# Patient Record
Sex: FEMALE | Race: BLACK OR AFRICAN AMERICAN | NOT HISPANIC OR LATINO | URBAN - METROPOLITAN AREA
[De-identification: names, ages, dates, MRNs, and addresses within clinical notes are randomized per-mention and may not be internally consistent; named-entity substitution may affect disease eponyms.]

---

## 2017-05-22 ENCOUNTER — OUTPATIENT (OUTPATIENT)
Dept: OUTPATIENT SERVICES | Facility: HOSPITAL | Age: 72
LOS: 1 days | Discharge: HOME | End: 2017-05-22

## 2017-05-22 DIAGNOSIS — D50.0 IRON DEFICIENCY ANEMIA SECONDARY TO BLOOD LOSS (CHRONIC): ICD-10-CM

## 2017-05-25 ENCOUNTER — APPOINTMENT (OUTPATIENT)
Dept: OPHTHALMOLOGY | Facility: CLINIC | Age: 72
End: 2017-05-25

## 2017-10-18 ENCOUNTER — APPOINTMENT (OUTPATIENT)
Dept: OPHTHALMOLOGY | Facility: CLINIC | Age: 72
End: 2017-10-18
Payer: MEDICARE

## 2017-10-18 PROCEDURE — 92012 INTRM OPH EXAM EST PATIENT: CPT

## 2017-10-18 PROCEDURE — 92083 EXTENDED VISUAL FIELD XM: CPT

## 2017-10-18 PROCEDURE — 76512 OPH US DX B-SCAN: CPT | Mod: RT

## 2017-10-31 ENCOUNTER — APPOINTMENT (OUTPATIENT)
Dept: SURGERY | Facility: CLINIC | Age: 72
End: 2017-10-31

## 2017-10-31 ENCOUNTER — OUTPATIENT (OUTPATIENT)
Dept: OUTPATIENT SERVICES | Facility: HOSPITAL | Age: 72
LOS: 1 days | End: 2017-10-31
Payer: MEDICARE

## 2017-10-31 DIAGNOSIS — E55.9 VITAMIN D DEFICIENCY, UNSPECIFIED: ICD-10-CM

## 2017-10-31 DIAGNOSIS — I10 ESSENTIAL (PRIMARY) HYPERTENSION: ICD-10-CM

## 2017-10-31 DIAGNOSIS — H40.1112 PRIMARY OPEN-ANGLE GLAUCOMA, RIGHT EYE, MODERATE STAGE: ICD-10-CM

## 2017-10-31 DIAGNOSIS — Z01.818 ENCOUNTER FOR OTHER PREPROCEDURAL EXAMINATION: ICD-10-CM

## 2017-10-31 DIAGNOSIS — E03.9 HYPOTHYROIDISM, UNSPECIFIED: ICD-10-CM

## 2017-10-31 DIAGNOSIS — E11.9 TYPE 2 DIABETES MELLITUS WITHOUT COMPLICATIONS: ICD-10-CM

## 2017-10-31 LAB
ANION GAP SERPL CALC-SCNC: 11 MMOL/L — SIGNIFICANT CHANGE UP (ref 5–17)
BUN SERPL-MCNC: 13 MG/DL — SIGNIFICANT CHANGE UP (ref 7–23)
CALCIUM SERPL-MCNC: 9.4 MG/DL — SIGNIFICANT CHANGE UP (ref 8.4–10.5)
CHLORIDE SERPL-SCNC: 104 MMOL/L — SIGNIFICANT CHANGE UP (ref 96–108)
CO2 SERPL-SCNC: 25 MMOL/L — SIGNIFICANT CHANGE UP (ref 22–31)
CREAT SERPL-MCNC: 0.61 MG/DL — SIGNIFICANT CHANGE UP (ref 0.5–1.3)
GLUCOSE SERPL-MCNC: 73 MG/DL — SIGNIFICANT CHANGE UP (ref 70–99)
POTASSIUM SERPL-MCNC: 4 MMOL/L — SIGNIFICANT CHANGE UP (ref 3.5–5.3)
POTASSIUM SERPL-SCNC: 4 MMOL/L — SIGNIFICANT CHANGE UP (ref 3.5–5.3)
SODIUM SERPL-SCNC: 140 MMOL/L — SIGNIFICANT CHANGE UP (ref 135–145)

## 2017-10-31 PROCEDURE — 93010 ELECTROCARDIOGRAM REPORT: CPT

## 2017-11-07 ENCOUNTER — OUTPATIENT (OUTPATIENT)
Dept: OUTPATIENT SERVICES | Facility: HOSPITAL | Age: 72
LOS: 1 days | Discharge: HOME | End: 2017-11-07

## 2017-11-07 ENCOUNTER — APPOINTMENT (OUTPATIENT)
Dept: OPHTHALMOLOGY | Facility: AMBULATORY SURGERY CENTER | Age: 72
End: 2017-11-07
Payer: MEDICARE

## 2017-11-07 PROCEDURE — 65779 COVER EYE W/MEMBRANE SUTURE: CPT | Mod: RT

## 2017-11-07 PROCEDURE — 66250 FOLLOW-UP SURGERY OF EYE: CPT | Mod: RT

## 2017-11-08 ENCOUNTER — APPOINTMENT (OUTPATIENT)
Dept: OPHTHALMOLOGY | Facility: CLINIC | Age: 72
End: 2017-11-08
Payer: MEDICARE

## 2017-11-08 PROCEDURE — 99024 POSTOP FOLLOW-UP VISIT: CPT

## 2017-11-17 ENCOUNTER — APPOINTMENT (OUTPATIENT)
Dept: OPHTHALMOLOGY | Facility: CLINIC | Age: 72
End: 2017-11-17
Payer: MEDICARE

## 2017-11-17 PROCEDURE — 99024 POSTOP FOLLOW-UP VISIT: CPT

## 2017-12-01 ENCOUNTER — APPOINTMENT (OUTPATIENT)
Dept: OPHTHALMOLOGY | Facility: CLINIC | Age: 72
End: 2017-12-01
Payer: MEDICARE

## 2017-12-01 PROCEDURE — 99024 POSTOP FOLLOW-UP VISIT: CPT

## 2018-01-12 ENCOUNTER — APPOINTMENT (OUTPATIENT)
Dept: OPHTHALMOLOGY | Facility: CLINIC | Age: 73
End: 2018-01-12
Payer: MEDICARE

## 2018-01-12 DIAGNOSIS — H40.1112 PRIMARY OPEN-ANGLE GLAUCOMA, RIGHT EYE, MODERATE STAGE: ICD-10-CM

## 2018-01-12 PROCEDURE — 99024 POSTOP FOLLOW-UP VISIT: CPT

## 2018-04-11 ENCOUNTER — APPOINTMENT (OUTPATIENT)
Dept: OPHTHALMOLOGY | Facility: CLINIC | Age: 73
End: 2018-04-11
Payer: MEDICARE

## 2018-04-11 DIAGNOSIS — H25.11 AGE-RELATED NUCLEAR CATARACT, RIGHT EYE: ICD-10-CM

## 2018-04-11 PROCEDURE — 92012 INTRM OPH EXAM EST PATIENT: CPT

## 2018-06-20 RX ORDER — PREDNISOLONE ACETATE 10 MG/ML
1 SUSPENSION/ DROPS OPHTHALMIC
Qty: 1 | Refills: 0 | Status: ACTIVE | COMMUNITY
Start: 2017-11-02 | End: 1900-01-01

## 2018-06-25 ENCOUNTER — APPOINTMENT (OUTPATIENT)
Dept: OPHTHALMOLOGY | Facility: CLINIC | Age: 73
End: 2018-06-25
Payer: MEDICARE

## 2018-06-25 DIAGNOSIS — H26.492 OTHER SECONDARY CATARACT, LEFT EYE: ICD-10-CM

## 2018-06-25 PROCEDURE — 66821 AFTER CATARACT LASER SURGERY: CPT | Mod: LT

## 2018-09-26 ENCOUNTER — APPOINTMENT (OUTPATIENT)
Dept: OPHTHALMOLOGY | Facility: CLINIC | Age: 73
End: 2018-09-26
Payer: MEDICARE

## 2018-09-26 DIAGNOSIS — H35.372 PUCKERING OF MACULA, LEFT EYE: ICD-10-CM

## 2018-09-26 PROCEDURE — 92083 EXTENDED VISUAL FIELD XM: CPT

## 2018-09-26 PROCEDURE — 92020 GONIOSCOPY: CPT

## 2018-09-26 PROCEDURE — 92250 FUNDUS PHOTOGRAPHY W/I&R: CPT

## 2018-09-26 PROCEDURE — 92226: CPT | Mod: LT

## 2018-09-26 PROCEDURE — 92014 COMPRE OPH EXAM EST PT 1/>: CPT

## 2018-10-10 ENCOUNTER — TRANSCRIPTION ENCOUNTER (OUTPATIENT)
Age: 73
End: 2018-10-10

## 2019-03-11 ENCOUNTER — APPOINTMENT (OUTPATIENT)
Dept: OPHTHALMOLOGY | Facility: CLINIC | Age: 74
End: 2019-03-11
Payer: MEDICARE

## 2019-03-11 DIAGNOSIS — H35.3130 NONEXUDATIVE AGE-RELATED MACULAR DEGENERATION, BILATERAL, STAGE UNSPECIFIED: ICD-10-CM

## 2019-03-11 DIAGNOSIS — H40.1123 PRIMARY OPEN-ANGLE GLAUCOMA, LEFT EYE, SEVERE STAGE: ICD-10-CM

## 2019-03-11 PROCEDURE — 92133 CPTRZD OPH DX IMG PST SGM ON: CPT

## 2019-03-11 PROCEDURE — 92014 COMPRE OPH EXAM EST PT 1/>: CPT

## 2019-03-11 PROCEDURE — 92083 EXTENDED VISUAL FIELD XM: CPT

## 2019-08-01 ENCOUNTER — APPOINTMENT (OUTPATIENT)
Dept: OPHTHALMOLOGY | Facility: CLINIC | Age: 74
End: 2019-08-01
Payer: MEDICARE

## 2019-08-01 ENCOUNTER — NON-APPOINTMENT (OUTPATIENT)
Age: 74
End: 2019-08-01

## 2019-08-01 PROCEDURE — 92250 FUNDUS PHOTOGRAPHY W/I&R: CPT

## 2019-08-01 PROCEDURE — 92014 COMPRE OPH EXAM EST PT 1/>: CPT

## 2019-08-01 PROCEDURE — 92136 OPHTHALMIC BIOMETRY: CPT | Mod: RT

## 2019-08-01 PROCEDURE — 92083 EXTENDED VISUAL FIELD XM: CPT

## 2019-08-01 PROCEDURE — 92020 GONIOSCOPY: CPT

## 2019-09-03 ENCOUNTER — OUTPATIENT (OUTPATIENT)
Dept: OUTPATIENT SERVICES | Facility: HOSPITAL | Age: 74
LOS: 1 days | Discharge: HOME | End: 2019-09-03

## 2019-09-03 ENCOUNTER — APPOINTMENT (OUTPATIENT)
Dept: OPHTHALMOLOGY | Facility: AMBULATORY SURGERY CENTER | Age: 74
End: 2019-09-03
Payer: MEDICARE

## 2019-09-03 PROCEDURE — 66984 XCAPSL CTRC RMVL W/O ECP: CPT | Mod: RT

## 2019-09-04 ENCOUNTER — APPOINTMENT (OUTPATIENT)
Dept: OPHTHALMOLOGY | Facility: CLINIC | Age: 74
End: 2019-09-04
Payer: MEDICARE

## 2019-09-04 ENCOUNTER — NON-APPOINTMENT (OUTPATIENT)
Age: 74
End: 2019-09-04

## 2019-09-04 PROCEDURE — 99024 POSTOP FOLLOW-UP VISIT: CPT

## 2019-09-09 ENCOUNTER — NON-APPOINTMENT (OUTPATIENT)
Age: 74
End: 2019-09-09

## 2019-09-09 ENCOUNTER — APPOINTMENT (OUTPATIENT)
Dept: OPHTHALMOLOGY | Facility: CLINIC | Age: 74
End: 2019-09-09
Payer: MEDICARE

## 2019-09-09 PROCEDURE — 99024 POSTOP FOLLOW-UP VISIT: CPT

## 2019-09-23 ENCOUNTER — APPOINTMENT (OUTPATIENT)
Dept: OPHTHALMOLOGY | Facility: CLINIC | Age: 74
End: 2019-09-23
Payer: MEDICARE

## 2019-09-23 ENCOUNTER — NON-APPOINTMENT (OUTPATIENT)
Age: 74
End: 2019-09-23

## 2019-09-23 PROCEDURE — 99024 POSTOP FOLLOW-UP VISIT: CPT

## 2019-11-04 ENCOUNTER — APPOINTMENT (OUTPATIENT)
Dept: OPHTHALMOLOGY | Facility: CLINIC | Age: 74
End: 2019-11-04
Payer: MEDICARE

## 2019-11-04 ENCOUNTER — NON-APPOINTMENT (OUTPATIENT)
Age: 74
End: 2019-11-04

## 2019-11-04 PROCEDURE — 92012 INTRM OPH EXAM EST PATIENT: CPT | Mod: 24

## 2019-11-04 PROCEDURE — 92134 CPTRZ OPH DX IMG PST SGM RTA: CPT

## 2019-12-02 ENCOUNTER — NON-APPOINTMENT (OUTPATIENT)
Age: 74
End: 2019-12-02

## 2019-12-02 ENCOUNTER — APPOINTMENT (OUTPATIENT)
Dept: OPHTHALMOLOGY | Facility: CLINIC | Age: 74
End: 2019-12-02
Payer: MEDICARE

## 2019-12-02 PROCEDURE — 92134 CPTRZ OPH DX IMG PST SGM RTA: CPT

## 2019-12-02 PROCEDURE — 92012 INTRM OPH EXAM EST PATIENT: CPT | Mod: 24

## 2020-03-26 ENCOUNTER — APPOINTMENT (OUTPATIENT)
Age: 75
End: 2020-03-26

## 2020-06-26 ENCOUNTER — APPOINTMENT (OUTPATIENT)
Dept: OPHTHALMOLOGY | Facility: CLINIC | Age: 75
End: 2020-06-26

## 2020-07-02 ENCOUNTER — APPOINTMENT (OUTPATIENT)
Dept: OPHTHALMOLOGY | Facility: CLINIC | Age: 75
End: 2020-07-02

## 2020-07-10 ENCOUNTER — NON-APPOINTMENT (OUTPATIENT)
Age: 75
End: 2020-07-10

## 2020-07-10 ENCOUNTER — APPOINTMENT (OUTPATIENT)
Dept: OPHTHALMOLOGY | Facility: CLINIC | Age: 75
End: 2020-07-10
Payer: MEDICARE

## 2020-07-10 PROCEDURE — 99441: CPT | Mod: 95

## 2020-08-10 ENCOUNTER — APPOINTMENT (OUTPATIENT)
Age: 75
End: 2020-08-10
Payer: MEDICARE

## 2020-08-10 ENCOUNTER — NON-APPOINTMENT (OUTPATIENT)
Age: 75
End: 2020-08-10

## 2020-08-10 PROCEDURE — 92083 EXTENDED VISUAL FIELD XM: CPT

## 2020-08-10 PROCEDURE — 92014 COMPRE OPH EXAM EST PT 1/>: CPT

## 2020-08-10 PROCEDURE — 92250 FUNDUS PHOTOGRAPHY W/I&R: CPT

## 2020-08-25 ENCOUNTER — NON-APPOINTMENT (OUTPATIENT)
Age: 75
End: 2020-08-25

## 2020-08-25 ENCOUNTER — APPOINTMENT (OUTPATIENT)
Dept: OPHTHALMOLOGY | Facility: CLINIC | Age: 75
End: 2020-08-25
Payer: MEDICARE

## 2020-08-25 PROCEDURE — 99212 OFFICE O/P EST SF 10 MIN: CPT

## 2020-09-01 ENCOUNTER — APPOINTMENT (OUTPATIENT)
Dept: OPHTHALMOLOGY | Facility: CLINIC | Age: 75
End: 2020-09-01
Payer: MEDICARE

## 2020-09-01 ENCOUNTER — NON-APPOINTMENT (OUTPATIENT)
Age: 75
End: 2020-09-01

## 2020-09-01 PROCEDURE — 92012 INTRM OPH EXAM EST PATIENT: CPT

## 2020-12-14 ENCOUNTER — NON-APPOINTMENT (OUTPATIENT)
Age: 75
End: 2020-12-14

## 2020-12-14 ENCOUNTER — APPOINTMENT (OUTPATIENT)
Age: 75
End: 2020-12-14
Payer: MEDICARE

## 2020-12-14 PROCEDURE — 92133 CPTRZD OPH DX IMG PST SGM ON: CPT

## 2020-12-14 PROCEDURE — 92012 INTRM OPH EXAM EST PATIENT: CPT

## 2020-12-14 PROCEDURE — 92083 EXTENDED VISUAL FIELD XM: CPT

## 2021-01-28 ENCOUNTER — NON-APPOINTMENT (OUTPATIENT)
Age: 76
End: 2021-01-28

## 2021-02-03 ENCOUNTER — NON-APPOINTMENT (OUTPATIENT)
Age: 76
End: 2021-02-03

## 2021-02-03 ENCOUNTER — APPOINTMENT (OUTPATIENT)
Dept: OPHTHALMOLOGY | Facility: CLINIC | Age: 76
End: 2021-02-03
Payer: MEDICARE

## 2021-02-03 PROCEDURE — 92012 INTRM OPH EXAM EST PATIENT: CPT

## 2021-02-28 ENCOUNTER — TRANSCRIPTION ENCOUNTER (OUTPATIENT)
Age: 76
End: 2021-02-28

## 2021-03-01 ENCOUNTER — RESULT REVIEW (OUTPATIENT)
Age: 76
End: 2021-03-01

## 2021-03-01 ENCOUNTER — NON-APPOINTMENT (OUTPATIENT)
Age: 76
End: 2021-03-01

## 2021-03-01 ENCOUNTER — APPOINTMENT (OUTPATIENT)
Dept: OPHTHALMOLOGY | Facility: AMBULATORY SURGERY CENTER | Age: 76
End: 2021-03-01

## 2021-03-01 ENCOUNTER — OUTPATIENT (OUTPATIENT)
Dept: OUTPATIENT SERVICES | Facility: HOSPITAL | Age: 76
LOS: 1 days | Discharge: HOME | End: 2021-03-01
Payer: MEDICARE

## 2021-03-01 LAB
GRAM STN FLD: SIGNIFICANT CHANGE UP
SPECIMEN SOURCE: SIGNIFICANT CHANGE UP

## 2021-03-01 PROCEDURE — 88304 TISSUE EXAM BY PATHOLOGIST: CPT | Mod: 26

## 2021-03-01 PROCEDURE — 65756 CORNEAL TRNSPL ENDOTHELIAL: CPT | Mod: LT

## 2021-03-02 ENCOUNTER — APPOINTMENT (OUTPATIENT)
Dept: OPHTHALMOLOGY | Facility: CLINIC | Age: 76
End: 2021-03-02
Payer: MEDICARE

## 2021-03-02 ENCOUNTER — NON-APPOINTMENT (OUTPATIENT)
Age: 76
End: 2021-03-02

## 2021-03-02 PROCEDURE — 99024 POSTOP FOLLOW-UP VISIT: CPT

## 2021-03-04 LAB
CULTURE RESULTS: NO GROWTH — SIGNIFICANT CHANGE UP
SPECIMEN SOURCE: SIGNIFICANT CHANGE UP
SURGICAL PATHOLOGY STUDY: SIGNIFICANT CHANGE UP

## 2021-03-09 ENCOUNTER — APPOINTMENT (OUTPATIENT)
Dept: OPHTHALMOLOGY | Facility: CLINIC | Age: 76
End: 2021-03-09
Payer: MEDICARE

## 2021-03-09 ENCOUNTER — NON-APPOINTMENT (OUTPATIENT)
Age: 76
End: 2021-03-09

## 2021-03-09 PROCEDURE — 99024 POSTOP FOLLOW-UP VISIT: CPT

## 2021-03-30 ENCOUNTER — APPOINTMENT (OUTPATIENT)
Dept: OPHTHALMOLOGY | Facility: CLINIC | Age: 76
End: 2021-03-30
Payer: MEDICARE

## 2021-03-30 ENCOUNTER — NON-APPOINTMENT (OUTPATIENT)
Age: 76
End: 2021-03-30

## 2021-03-30 PROCEDURE — 99024 POSTOP FOLLOW-UP VISIT: CPT

## 2021-04-12 ENCOUNTER — APPOINTMENT (OUTPATIENT)
Dept: OPHTHALMOLOGY | Facility: CLINIC | Age: 76
End: 2021-04-12
Payer: MEDICARE

## 2021-04-12 ENCOUNTER — NON-APPOINTMENT (OUTPATIENT)
Age: 76
End: 2021-04-12

## 2021-04-12 PROCEDURE — 92012 INTRM OPH EXAM EST PATIENT: CPT | Mod: 24

## 2021-04-12 PROCEDURE — 92083 EXTENDED VISUAL FIELD XM: CPT

## 2021-04-28 ENCOUNTER — APPOINTMENT (OUTPATIENT)
Dept: OPHTHALMOLOGY | Facility: CLINIC | Age: 76
End: 2021-04-28
Payer: MEDICARE

## 2021-04-28 ENCOUNTER — NON-APPOINTMENT (OUTPATIENT)
Age: 76
End: 2021-04-28

## 2021-04-28 PROCEDURE — 99024 POSTOP FOLLOW-UP VISIT: CPT

## 2021-04-28 PROCEDURE — 92020 GONIOSCOPY: CPT

## 2021-06-09 ENCOUNTER — APPOINTMENT (OUTPATIENT)
Dept: OPHTHALMOLOGY | Facility: CLINIC | Age: 76
End: 2021-06-09
Payer: MEDICARE

## 2021-06-09 ENCOUNTER — NON-APPOINTMENT (OUTPATIENT)
Age: 76
End: 2021-06-09

## 2021-06-09 PROCEDURE — 92012 INTRM OPH EXAM EST PATIENT: CPT

## 2021-08-11 ENCOUNTER — NON-APPOINTMENT (OUTPATIENT)
Age: 76
End: 2021-08-11

## 2021-08-11 ENCOUNTER — APPOINTMENT (OUTPATIENT)
Dept: OPHTHALMOLOGY | Facility: CLINIC | Age: 76
End: 2021-08-11
Payer: MEDICARE

## 2021-08-11 PROCEDURE — 92012 INTRM OPH EXAM EST PATIENT: CPT

## 2021-08-12 ENCOUNTER — NON-APPOINTMENT (OUTPATIENT)
Age: 76
End: 2021-08-12

## 2021-08-12 ENCOUNTER — APPOINTMENT (OUTPATIENT)
Dept: OPHTHALMOLOGY | Facility: CLINIC | Age: 76
End: 2021-08-12
Payer: MEDICARE

## 2021-08-12 PROCEDURE — 92134 CPTRZ OPH DX IMG PST SGM RTA: CPT

## 2021-08-12 PROCEDURE — 92083 EXTENDED VISUAL FIELD XM: CPT

## 2021-08-12 PROCEDURE — 92012 INTRM OPH EXAM EST PATIENT: CPT

## 2021-11-10 ENCOUNTER — APPOINTMENT (OUTPATIENT)
Dept: OPHTHALMOLOGY | Facility: CLINIC | Age: 76
End: 2021-11-10
Payer: MEDICARE

## 2021-11-10 ENCOUNTER — NON-APPOINTMENT (OUTPATIENT)
Age: 76
End: 2021-11-10

## 2021-11-10 PROCEDURE — 92012 INTRM OPH EXAM EST PATIENT: CPT

## 2021-11-18 ENCOUNTER — NON-APPOINTMENT (OUTPATIENT)
Age: 76
End: 2021-11-18

## 2021-11-18 ENCOUNTER — APPOINTMENT (OUTPATIENT)
Dept: OPHTHALMOLOGY | Facility: CLINIC | Age: 76
End: 2021-11-18
Payer: MEDICARE

## 2021-11-18 PROCEDURE — 92083 EXTENDED VISUAL FIELD XM: CPT

## 2021-11-18 PROCEDURE — 92250 FUNDUS PHOTOGRAPHY W/I&R: CPT

## 2021-11-18 PROCEDURE — 92014 COMPRE OPH EXAM EST PT 1/>: CPT

## 2021-12-02 ENCOUNTER — APPOINTMENT (OUTPATIENT)
Dept: OTOLARYNGOLOGY | Facility: CLINIC | Age: 76
End: 2021-12-02
Payer: MEDICARE

## 2021-12-02 DIAGNOSIS — H90.5 UNSPECIFIED SENSORINEURAL HEARING LOSS: ICD-10-CM

## 2021-12-02 DIAGNOSIS — Z78.9 OTHER SPECIFIED HEALTH STATUS: ICD-10-CM

## 2021-12-02 DIAGNOSIS — H93.19 TINNITUS, UNSPECIFIED EAR: ICD-10-CM

## 2021-12-02 PROCEDURE — 99203 OFFICE O/P NEW LOW 30 MIN: CPT

## 2021-12-02 PROCEDURE — 92567 TYMPANOMETRY: CPT

## 2021-12-02 PROCEDURE — 92557 COMPREHENSIVE HEARING TEST: CPT

## 2021-12-02 RX ORDER — FEXOFENADINE HCL 60 MG
CAPSULE ORAL
Refills: 0 | Status: ACTIVE | COMMUNITY

## 2021-12-02 RX ORDER — OFLOXACIN 3 MG/ML
0.3 SOLUTION/ DROPS OPHTHALMIC
Qty: 1 | Refills: 2 | Status: COMPLETED | COMMUNITY
Start: 2017-11-02 | End: 2021-12-02

## 2021-12-02 RX ORDER — CHROMIUM 200 MCG
TABLET ORAL
Refills: 0 | Status: ACTIVE | COMMUNITY

## 2021-12-02 RX ORDER — KETOROLAC TROMETHAMINE 4 MG/ML
0.4 SOLUTION/ DROPS OPHTHALMIC
Qty: 1 | Refills: 11 | Status: COMPLETED | COMMUNITY
Start: 2017-11-02 | End: 2021-12-02

## 2021-12-02 NOTE — HISTORY OF PRESENT ILLNESS
[de-identified] : 76 year old female bilateral hearing loss. Reports gradual bilateral hearing loss started last year. States occasional bilateral clogged ears and hearing is worse. States does not have hearing aids. Reports hears better out of right ear. Denies family history of hearing loss. Reports intermittent bilateral tinnitus. Denies otalgia, otorrhea, ear infections, dizziness, vertigo, ear surgeries. Denies history of head/otologic trauma. Denies CT scan or MRI. Denies loud noise exposure. Feels has been going on for many years, but worse over last year.  [Hearing Loss] : hearing loss [Ear Fullness] : no ear fullness [Tinnitus] : tinnitus [None] : No relevant exposures have been identified.

## 2022-01-26 ENCOUNTER — APPOINTMENT (OUTPATIENT)
Dept: PHARMACY | Facility: CLINIC | Age: 77
End: 2022-01-26

## 2022-02-04 ENCOUNTER — APPOINTMENT (OUTPATIENT)
Dept: PHARMACY | Facility: CLINIC | Age: 77
End: 2022-02-04
Payer: SELF-PAY

## 2022-02-04 PROCEDURE — V5010 ASSESSMENT FOR HEARING AID: CPT

## 2022-02-09 ENCOUNTER — APPOINTMENT (OUTPATIENT)
Dept: OPHTHALMOLOGY | Facility: CLINIC | Age: 77
End: 2022-02-09
Payer: MEDICARE

## 2022-02-09 ENCOUNTER — NON-APPOINTMENT (OUTPATIENT)
Age: 77
End: 2022-02-09

## 2022-02-09 PROCEDURE — 92012 INTRM OPH EXAM EST PATIENT: CPT

## 2022-03-07 ENCOUNTER — APPOINTMENT (OUTPATIENT)
Dept: PHARMACY | Facility: CLINIC | Age: 77
End: 2022-03-07
Payer: SELF-PAY

## 2022-03-07 PROCEDURE — V5261J: CUSTOM

## 2022-03-10 ENCOUNTER — APPOINTMENT (OUTPATIENT)
Dept: OPHTHALMOLOGY | Facility: CLINIC | Age: 77
End: 2022-03-10
Payer: MEDICARE

## 2022-03-10 ENCOUNTER — NON-APPOINTMENT (OUTPATIENT)
Age: 77
End: 2022-03-10

## 2022-03-10 PROCEDURE — 92012 INTRM OPH EXAM EST PATIENT: CPT

## 2022-03-10 PROCEDURE — 92133 CPTRZD OPH DX IMG PST SGM ON: CPT

## 2022-04-11 ENCOUNTER — APPOINTMENT (OUTPATIENT)
Dept: PHARMACY | Facility: CLINIC | Age: 77
End: 2022-04-11
Payer: SELF-PAY

## 2022-04-11 PROCEDURE — V5299A: CUSTOM | Mod: NC

## 2022-06-10 ENCOUNTER — APPOINTMENT (OUTPATIENT)
Dept: OPHTHALMOLOGY | Facility: CLINIC | Age: 77
End: 2022-06-10
Payer: MEDICARE

## 2022-06-10 ENCOUNTER — NON-APPOINTMENT (OUTPATIENT)
Age: 77
End: 2022-06-10

## 2022-06-10 PROCEDURE — 92134 CPTRZ OPH DX IMG PST SGM RTA: CPT

## 2022-06-10 PROCEDURE — 92083 EXTENDED VISUAL FIELD XM: CPT

## 2022-06-10 PROCEDURE — 92012 INTRM OPH EXAM EST PATIENT: CPT

## 2022-08-01 ENCOUNTER — APPOINTMENT (OUTPATIENT)
Dept: PHARMACY | Facility: CLINIC | Age: 77
End: 2022-08-01

## 2022-08-01 PROCEDURE — V5299A: CUSTOM | Mod: NC

## 2022-08-03 ENCOUNTER — NON-APPOINTMENT (OUTPATIENT)
Age: 77
End: 2022-08-03

## 2022-08-03 ENCOUNTER — APPOINTMENT (OUTPATIENT)
Dept: OPHTHALMOLOGY | Facility: CLINIC | Age: 77
End: 2022-08-03

## 2022-08-03 PROCEDURE — 92012 INTRM OPH EXAM EST PATIENT: CPT

## 2022-10-07 ENCOUNTER — APPOINTMENT (OUTPATIENT)
Dept: OPHTHALMOLOGY | Facility: CLINIC | Age: 77
End: 2022-10-07

## 2022-10-07 ENCOUNTER — NON-APPOINTMENT (OUTPATIENT)
Age: 77
End: 2022-10-07

## 2022-10-07 PROCEDURE — 92250 FUNDUS PHOTOGRAPHY W/I&R: CPT

## 2022-10-07 PROCEDURE — 92014 COMPRE OPH EXAM EST PT 1/>: CPT

## 2022-10-07 PROCEDURE — 92083 EXTENDED VISUAL FIELD XM: CPT

## 2022-11-02 ENCOUNTER — NON-APPOINTMENT (OUTPATIENT)
Age: 77
End: 2022-11-02

## 2022-11-02 ENCOUNTER — APPOINTMENT (OUTPATIENT)
Dept: OPHTHALMOLOGY | Facility: CLINIC | Age: 77
End: 2022-11-02

## 2022-11-02 PROCEDURE — 92012 INTRM OPH EXAM EST PATIENT: CPT

## 2022-11-02 PROCEDURE — 92286 ANT SGM IMG I&R SPECLR MIC: CPT

## 2022-11-18 ENCOUNTER — OUTPATIENT (OUTPATIENT)
Dept: OUTPATIENT SERVICES | Facility: HOSPITAL | Age: 77
LOS: 1 days | Discharge: HOME | End: 2022-11-18
Payer: MEDICARE

## 2022-11-18 ENCOUNTER — RESULT REVIEW (OUTPATIENT)
Age: 77
End: 2022-11-18

## 2022-11-18 VITALS
HEART RATE: 79 BPM | DIASTOLIC BLOOD PRESSURE: 63 MMHG | TEMPERATURE: 97 F | WEIGHT: 231.04 LBS | SYSTOLIC BLOOD PRESSURE: 130 MMHG | RESPIRATION RATE: 22 BRPM | OXYGEN SATURATION: 98 % | HEIGHT: 64 IN

## 2022-11-18 DIAGNOSIS — K21.9 GASTRO-ESOPHAGEAL REFLUX DISEASE WITHOUT ESOPHAGITIS: ICD-10-CM

## 2022-11-18 DIAGNOSIS — Z90.49 ACQUIRED ABSENCE OF OTHER SPECIFIED PARTS OF DIGESTIVE TRACT: Chronic | ICD-10-CM

## 2022-11-18 DIAGNOSIS — E66.01 MORBID (SEVERE) OBESITY DUE TO EXCESS CALORIES: ICD-10-CM

## 2022-11-18 PROCEDURE — 88312 SPECIAL STAINS GROUP 1: CPT

## 2022-11-18 PROCEDURE — 88305 TISSUE EXAM BY PATHOLOGIST: CPT | Mod: 26

## 2022-11-18 PROCEDURE — 88312 SPECIAL STAINS GROUP 1: CPT | Mod: 26

## 2022-11-18 PROCEDURE — 88305 TISSUE EXAM BY PATHOLOGIST: CPT

## 2022-11-18 PROCEDURE — 88313 SPECIAL STAINS GROUP 2: CPT | Mod: 26

## 2022-11-18 PROCEDURE — 88313 SPECIAL STAINS GROUP 2: CPT

## 2022-11-18 RX ORDER — LEVOTHYROXINE SODIUM 125 MCG
0 TABLET ORAL
Qty: 0 | Refills: 0 | DISCHARGE

## 2022-11-18 RX ORDER — FEXOFENADINE HCL 30 MG
0 TABLET ORAL
Qty: 0 | Refills: 0 | DISCHARGE

## 2022-11-18 NOTE — ASU PATIENT PROFILE, ADULT - NSICDXPASTMEDICALHX_GEN_ALL_CORE_FT
PAST MEDICAL HISTORY:  GERD (gastroesophageal reflux disease)     HLD (hyperlipidemia)     Hypothyroid

## 2022-11-18 NOTE — ASU PATIENT PROFILE, ADULT - FALL HARM RISK - HARM RISK INTERVENTIONS

## 2022-11-24 DIAGNOSIS — R13.10 DYSPHAGIA, UNSPECIFIED: ICD-10-CM

## 2022-11-24 DIAGNOSIS — E55.9 VITAMIN D DEFICIENCY, UNSPECIFIED: ICD-10-CM

## 2022-11-24 DIAGNOSIS — K29.50 UNSPECIFIED CHRONIC GASTRITIS WITHOUT BLEEDING: ICD-10-CM

## 2022-11-24 DIAGNOSIS — Z90.49 ACQUIRED ABSENCE OF OTHER SPECIFIED PARTS OF DIGESTIVE TRACT: ICD-10-CM

## 2022-11-24 DIAGNOSIS — K20.90 ESOPHAGITIS, UNSPECIFIED WITHOUT BLEEDING: ICD-10-CM

## 2022-11-24 DIAGNOSIS — E03.9 HYPOTHYROIDISM, UNSPECIFIED: ICD-10-CM

## 2022-11-24 DIAGNOSIS — M19.90 UNSPECIFIED OSTEOARTHRITIS, UNSPECIFIED SITE: ICD-10-CM

## 2022-11-24 DIAGNOSIS — E78.5 HYPERLIPIDEMIA, UNSPECIFIED: ICD-10-CM

## 2022-11-24 DIAGNOSIS — E66.9 OBESITY, UNSPECIFIED: ICD-10-CM

## 2022-11-24 DIAGNOSIS — Z98.84 BARIATRIC SURGERY STATUS: ICD-10-CM

## 2023-01-24 PROBLEM — E78.5 HYPERLIPIDEMIA, UNSPECIFIED: Chronic | Status: ACTIVE | Noted: 2022-11-18

## 2023-01-24 PROBLEM — K21.9 GASTRO-ESOPHAGEAL REFLUX DISEASE WITHOUT ESOPHAGITIS: Chronic | Status: ACTIVE | Noted: 2022-11-18

## 2023-01-24 PROBLEM — E03.9 HYPOTHYROIDISM, UNSPECIFIED: Chronic | Status: ACTIVE | Noted: 2022-11-18

## 2023-02-10 ENCOUNTER — NON-APPOINTMENT (OUTPATIENT)
Age: 78
End: 2023-02-10

## 2023-02-10 ENCOUNTER — APPOINTMENT (OUTPATIENT)
Dept: OPHTHALMOLOGY | Facility: CLINIC | Age: 78
End: 2023-02-10
Payer: MEDICARE

## 2023-02-10 PROCEDURE — 92012 INTRM OPH EXAM EST PATIENT: CPT

## 2023-02-10 PROCEDURE — 92134 CPTRZ OPH DX IMG PST SGM RTA: CPT

## 2023-03-15 ENCOUNTER — APPOINTMENT (OUTPATIENT)
Dept: OPHTHALMOLOGY | Facility: CLINIC | Age: 78
End: 2023-03-15
Payer: MEDICARE

## 2023-03-15 ENCOUNTER — NON-APPOINTMENT (OUTPATIENT)
Age: 78
End: 2023-03-15

## 2023-03-15 PROCEDURE — 92012 INTRM OPH EXAM EST PATIENT: CPT

## 2023-06-19 ENCOUNTER — APPOINTMENT (OUTPATIENT)
Dept: PHARMACY | Facility: CLINIC | Age: 78
End: 2023-06-19
Payer: SELF-PAY

## 2023-06-19 PROCEDURE — V5267C: CUSTOM

## 2023-06-19 PROCEDURE — V5267D: CUSTOM

## 2023-08-07 ENCOUNTER — NON-APPOINTMENT (OUTPATIENT)
Age: 78
End: 2023-08-07

## 2023-08-07 ENCOUNTER — APPOINTMENT (OUTPATIENT)
Dept: OPHTHALMOLOGY | Facility: CLINIC | Age: 78
End: 2023-08-07
Payer: MEDICARE

## 2023-08-07 PROCEDURE — 92012 INTRM OPH EXAM EST PATIENT: CPT

## 2023-08-07 PROCEDURE — 92083 EXTENDED VISUAL FIELD XM: CPT

## 2023-08-07 PROCEDURE — 92133 CPTRZD OPH DX IMG PST SGM ON: CPT

## 2023-08-17 ENCOUNTER — NON-APPOINTMENT (OUTPATIENT)
Age: 78
End: 2023-08-17

## 2023-08-18 ENCOUNTER — NON-APPOINTMENT (OUTPATIENT)
Age: 78
End: 2023-08-18

## 2023-08-18 ENCOUNTER — APPOINTMENT (OUTPATIENT)
Dept: OPHTHALMOLOGY | Facility: CLINIC | Age: 78
End: 2023-08-18
Payer: MEDICARE

## 2023-08-18 PROCEDURE — 92286 ANT SGM IMG I&R SPECLR MIC: CPT

## 2023-08-18 PROCEDURE — 92012 INTRM OPH EXAM EST PATIENT: CPT

## 2023-08-25 ENCOUNTER — APPOINTMENT (OUTPATIENT)
Dept: OTOLARYNGOLOGY | Facility: CLINIC | Age: 78
End: 2023-08-25
Payer: MEDICARE

## 2023-08-25 VITALS
DIASTOLIC BLOOD PRESSURE: 86 MMHG | WEIGHT: 219 LBS | SYSTOLIC BLOOD PRESSURE: 137 MMHG | HEART RATE: 65 BPM | BODY MASS INDEX: 38.8 KG/M2 | HEIGHT: 63 IN

## 2023-08-25 PROCEDURE — 92557 COMPREHENSIVE HEARING TEST: CPT

## 2023-08-25 PROCEDURE — 99213 OFFICE O/P EST LOW 20 MIN: CPT

## 2023-08-25 PROCEDURE — 92567 TYMPANOMETRY: CPT

## 2023-08-25 NOTE — HISTORY OF PRESENT ILLNESS
[de-identified] : 77 year old female following up for bilateral hearing loss  Report consistent use of bilateral hearing aids  States hearing has improved since using HAs.  Reports constant tinnitus in left ear, occasional on right side  Reports intermittent clogged sensation on left side.  Improves when she opens and closes her mouth.  Patient denies otalgia, otorrhea, ear infections, dizziness, vertigo, headaches related to hearing.

## 2023-12-17 ENCOUNTER — NON-APPOINTMENT (OUTPATIENT)
Age: 78
End: 2023-12-17

## 2023-12-18 ENCOUNTER — APPOINTMENT (OUTPATIENT)
Dept: OPHTHALMOLOGY | Facility: CLINIC | Age: 78
End: 2023-12-18
Payer: MEDICARE

## 2023-12-18 ENCOUNTER — NON-APPOINTMENT (OUTPATIENT)
Age: 78
End: 2023-12-18

## 2023-12-18 PROCEDURE — 92134 CPTRZ OPH DX IMG PST SGM RTA: CPT

## 2023-12-18 PROCEDURE — 92012 INTRM OPH EXAM EST PATIENT: CPT

## 2024-02-21 ENCOUNTER — APPOINTMENT (OUTPATIENT)
Dept: OPHTHALMOLOGY | Facility: CLINIC | Age: 79
End: 2024-02-21
Payer: MEDICARE

## 2024-02-21 ENCOUNTER — NON-APPOINTMENT (OUTPATIENT)
Age: 79
End: 2024-02-21

## 2024-02-21 PROCEDURE — 92012 INTRM OPH EXAM EST PATIENT: CPT

## 2024-04-22 ENCOUNTER — APPOINTMENT (OUTPATIENT)
Dept: OPHTHALMOLOGY | Facility: CLINIC | Age: 79
End: 2024-04-22
Payer: MEDICARE

## 2024-04-22 ENCOUNTER — NON-APPOINTMENT (OUTPATIENT)
Age: 79
End: 2024-04-22

## 2024-04-22 PROCEDURE — 92083 EXTENDED VISUAL FIELD XM: CPT

## 2024-04-22 PROCEDURE — 92012 INTRM OPH EXAM EST PATIENT: CPT

## 2024-05-29 ENCOUNTER — APPOINTMENT (OUTPATIENT)
Dept: PHARMACY | Facility: CLINIC | Age: 79
End: 2024-05-29
Payer: SELF-PAY

## 2024-05-29 PROCEDURE — V5299A: CUSTOM

## 2024-07-10 ENCOUNTER — NON-APPOINTMENT (OUTPATIENT)
Age: 79
End: 2024-07-10

## 2024-07-10 ENCOUNTER — APPOINTMENT (OUTPATIENT)
Dept: OPHTHALMOLOGY | Facility: CLINIC | Age: 79
End: 2024-07-10
Payer: MEDICARE

## 2024-07-10 PROCEDURE — 92012 INTRM OPH EXAM EST PATIENT: CPT

## 2024-07-10 PROCEDURE — 92286 ANT SGM IMG I&R SPECLR MIC: CPT

## 2024-08-26 ENCOUNTER — NON-APPOINTMENT (OUTPATIENT)
Age: 79
End: 2024-08-26

## 2024-08-26 ENCOUNTER — APPOINTMENT (OUTPATIENT)
Dept: OPHTHALMOLOGY | Facility: CLINIC | Age: 79
End: 2024-08-26
Payer: COMMERCIAL

## 2024-08-26 PROCEDURE — 92012 INTRM OPH EXAM EST PATIENT: CPT

## 2024-08-26 PROCEDURE — 92133 CPTRZD OPH DX IMG PST SGM ON: CPT

## 2024-12-02 ENCOUNTER — NON-APPOINTMENT (OUTPATIENT)
Age: 79
End: 2024-12-02

## 2024-12-02 ENCOUNTER — APPOINTMENT (OUTPATIENT)
Dept: OPHTHALMOLOGY | Facility: CLINIC | Age: 79
End: 2024-12-02
Payer: MEDICARE

## 2024-12-02 PROCEDURE — 92014 COMPRE OPH EXAM EST PT 1/>: CPT

## 2024-12-02 PROCEDURE — 92133 CPTRZD OPH DX IMG PST SGM ON: CPT

## 2024-12-02 PROCEDURE — 92083 EXTENDED VISUAL FIELD XM: CPT

## 2024-12-17 ENCOUNTER — INPATIENT (INPATIENT)
Facility: HOSPITAL | Age: 79
LOS: 2 days | Discharge: HOME | End: 2024-12-20
Attending: HOSPITALIST | Admitting: HOSPITALIST
Payer: MEDICARE

## 2024-12-17 VITALS
HEART RATE: 70 BPM | RESPIRATION RATE: 18 BRPM | WEIGHT: 210.1 LBS | DIASTOLIC BLOOD PRESSURE: 82 MMHG | HEIGHT: 64 IN | SYSTOLIC BLOOD PRESSURE: 151 MMHG | TEMPERATURE: 98 F | OXYGEN SATURATION: 99 %

## 2024-12-17 DIAGNOSIS — Z90.49 ACQUIRED ABSENCE OF OTHER SPECIFIED PARTS OF DIGESTIVE TRACT: Chronic | ICD-10-CM

## 2024-12-17 LAB
ALBUMIN SERPL ELPH-MCNC: 3.9 G/DL — SIGNIFICANT CHANGE UP (ref 3.3–5)
ALP SERPL-CCNC: 134 U/L — HIGH (ref 40–120)
ALT FLD-CCNC: 15 U/L — SIGNIFICANT CHANGE UP (ref 4–33)
ANION GAP SERPL CALC-SCNC: 11 MMOL/L — SIGNIFICANT CHANGE UP (ref 7–14)
APTT BLD: 25.9 SEC — SIGNIFICANT CHANGE UP (ref 24.5–35.6)
AST SERPL-CCNC: 26 U/L — SIGNIFICANT CHANGE UP (ref 4–32)
BASOPHILS # BLD AUTO: 0.02 K/UL — SIGNIFICANT CHANGE UP (ref 0–0.2)
BASOPHILS NFR BLD AUTO: 0.4 % — SIGNIFICANT CHANGE UP (ref 0–2)
BILIRUB SERPL-MCNC: <0.2 MG/DL — SIGNIFICANT CHANGE UP (ref 0.2–1.2)
BUN SERPL-MCNC: 19 MG/DL — SIGNIFICANT CHANGE UP (ref 7–23)
CALCIUM SERPL-MCNC: 9.3 MG/DL — SIGNIFICANT CHANGE UP (ref 8.4–10.5)
CHLORIDE SERPL-SCNC: 106 MMOL/L — SIGNIFICANT CHANGE UP (ref 98–107)
CO2 SERPL-SCNC: 23 MMOL/L — SIGNIFICANT CHANGE UP (ref 22–31)
CREAT SERPL-MCNC: 0.78 MG/DL — SIGNIFICANT CHANGE UP (ref 0.5–1.3)
EGFR: 77 ML/MIN/1.73M2 — SIGNIFICANT CHANGE UP
EOSINOPHIL # BLD AUTO: 0.05 K/UL — SIGNIFICANT CHANGE UP (ref 0–0.5)
EOSINOPHIL NFR BLD AUTO: 1 % — SIGNIFICANT CHANGE UP (ref 0–6)
GLUCOSE SERPL-MCNC: 67 MG/DL — LOW (ref 70–99)
HCT VFR BLD CALC: 36.6 % — SIGNIFICANT CHANGE UP (ref 34.5–45)
HGB BLD-MCNC: 11.7 G/DL — SIGNIFICANT CHANGE UP (ref 11.5–15.5)
IANC: 2.3 K/UL — SIGNIFICANT CHANGE UP (ref 1.8–7.4)
IMM GRANULOCYTES NFR BLD AUTO: 0.2 % — SIGNIFICANT CHANGE UP (ref 0–0.9)
INR BLD: 0.91 RATIO — SIGNIFICANT CHANGE UP (ref 0.85–1.16)
LYMPHOCYTES # BLD AUTO: 2.03 K/UL — SIGNIFICANT CHANGE UP (ref 1–3.3)
LYMPHOCYTES # BLD AUTO: 41.9 % — SIGNIFICANT CHANGE UP (ref 13–44)
MCHC RBC-ENTMCNC: 30.4 PG — SIGNIFICANT CHANGE UP (ref 27–34)
MCHC RBC-ENTMCNC: 32 G/DL — SIGNIFICANT CHANGE UP (ref 32–36)
MCV RBC AUTO: 95.1 FL — SIGNIFICANT CHANGE UP (ref 80–100)
MONOCYTES # BLD AUTO: 0.44 K/UL — SIGNIFICANT CHANGE UP (ref 0–0.9)
MONOCYTES NFR BLD AUTO: 9.1 % — SIGNIFICANT CHANGE UP (ref 2–14)
NEUTROPHILS # BLD AUTO: 2.3 K/UL — SIGNIFICANT CHANGE UP (ref 1.8–7.4)
NEUTROPHILS NFR BLD AUTO: 47.4 % — SIGNIFICANT CHANGE UP (ref 43–77)
NRBC # BLD: 0 /100 WBCS — SIGNIFICANT CHANGE UP (ref 0–0)
NRBC # FLD: 0 K/UL — SIGNIFICANT CHANGE UP (ref 0–0)
PLATELET # BLD AUTO: 259 K/UL — SIGNIFICANT CHANGE UP (ref 150–400)
POTASSIUM SERPL-MCNC: 4.6 MMOL/L — SIGNIFICANT CHANGE UP (ref 3.5–5.3)
POTASSIUM SERPL-SCNC: 4.6 MMOL/L — SIGNIFICANT CHANGE UP (ref 3.5–5.3)
PROT SERPL-MCNC: 7.3 G/DL — SIGNIFICANT CHANGE UP (ref 6–8.3)
PROTHROM AB SERPL-ACNC: 10.6 SEC — SIGNIFICANT CHANGE UP (ref 9.9–13.4)
RBC # BLD: 3.85 M/UL — SIGNIFICANT CHANGE UP (ref 3.8–5.2)
RBC # FLD: 13.3 % — SIGNIFICANT CHANGE UP (ref 10.3–14.5)
SODIUM SERPL-SCNC: 140 MMOL/L — SIGNIFICANT CHANGE UP (ref 135–145)
TROPONIN T, HIGH SENSITIVITY RESULT: 11 NG/L — SIGNIFICANT CHANGE UP
WBC # BLD: 4.85 K/UL — SIGNIFICANT CHANGE UP (ref 3.8–10.5)
WBC # FLD AUTO: 4.85 K/UL — SIGNIFICANT CHANGE UP (ref 3.8–10.5)

## 2024-12-17 PROCEDURE — 70450 CT HEAD/BRAIN W/O DYE: CPT | Mod: 26,MC

## 2024-12-17 PROCEDURE — 99285 EMERGENCY DEPT VISIT HI MDM: CPT

## 2024-12-17 RX ORDER — METHYLPREDNISOLONE SOD SUCC 125 MG
40 VIAL (EA) INJECTION ONCE
Refills: 0 | Status: COMPLETED | OUTPATIENT
Start: 2024-12-17 | End: 2024-12-17

## 2024-12-17 RX ORDER — DIPHENHYDRAMINE HCL 25 MG
50 CAPSULE ORAL ONCE
Refills: 0 | Status: DISCONTINUED | OUTPATIENT
Start: 2024-12-17 | End: 2024-12-18

## 2024-12-17 RX ADMIN — Medication 40 MILLIGRAM(S): at 23:55

## 2024-12-17 NOTE — ED ADULT NURSE NOTE - OBJECTIVE STATEMENT
[place] RN: Pt received at CT scan as code stroke. Pt alert and oriented x 4, ambulatory with cane at baseline. Hx of hypothyroidism (noncompliant with medication). Pt c/o sudden onset of dizziness, headache, and left arm numbness that began 1 hour prior to arrival that has resolved at this time. Pt reports decreased sensation to the arm and left side of face.  POCT blood glucose in triage 64, repeat 66, pt passed dysphagia, provided apple juice. Gait steady. Respirations even and unlabored, NAD. Pt denies chest pain, shortness of breath, N/V/D, fever, chills,  symptoms. 20G IV in the left AC, labs drawn per MD orders. Report given to primary RN.

## 2024-12-17 NOTE — ED ADULT NURSE NOTE - NSFALLRISKINTERV_ED_ALL_ED
Avoid any supplements Assistance OOB with selected safe patient handling equipment if applicable/Assistance with ambulation/Communicate fall risk and risk factors to all staff, patient, and family/Encourage patient to sit up slowly, dangle for a short time, stand at bedside before walking/Monitor gait and stability/Orthostatic vital signs/Provide patient with walking aids/Provide visual cue: yellow wristband, yellow gown, etc/Reinforce activity limits and safety measures with patient and family/Call bell, personal items and telephone in reach/Instruct patient to call for assistance before getting out of bed/chair/stretcher/Non-slip footwear applied when patient is off stretcher/[place] to call system/Physically safe environment - no spills, clutter or unnecessary equipment/Purposeful Proactive Rounding/Room/bathroom lighting operational, light cord in reach

## 2024-12-17 NOTE — ED ADULT TRIAGE NOTE - CHIEF COMPLAINT QUOTE
Pt reports sudden onset dizziness, left arm numbness, headache about an hour ago that has since resolved. Pt with residual decreased sensation to the left side. Denies chest pain, SOB.  Phx hypothyroidism.

## 2024-12-17 NOTE — ED PROVIDER NOTE - ATTENDING CONTRIBUTION TO CARE
80yo F ho htn, hypothyroidism pw cramping and numbness in left arm and neck and headache and dizziness iand unsteadiness intermittently,   face, arm and leg with subjective decreased sensation on left side   slight drift on left arm   symptoms started about 9pm   code stroke called, neuro at bedside, dispo pending labs and CT imaging 80yo F ho htn, hypothyroidism pw cramping and numbness in left arm and neck and headache and dizziness iand unsteadiness intermittently,   face, arm and leg with subjective decreased sensation on left side   slight drift on left arm   symptoms started about 9pm   code stroke called, neuro at bedside, dispo pending labs and CT imaging  pt hypoglycemic in triage, symptoms did no improvement in symptoms  also pt states she has a contrast allergy, dw neuro, will do CT noncon and premedicate for angio

## 2024-12-17 NOTE — ED PROVIDER NOTE - CLINICAL SUMMARY MEDICAL DECISION MAKING FREE TEXT BOX
Josh Valera MD, PGY3  79-year-old female with past medical history of pretension, hypothyroidism, not currently taking any daily medications presenting to the emergency department with numbness, tingling of left upper extremity and left side of face that began today around 9-10 PM.  Patient also states she felt dizziness during this episode.  Patient was sitting down watching TV with family when symptoms began.  Has never had similar symptoms before in the past.  Of note patient stopped taking her levothyroxine 1 month ago because she stated that she did not want to be taking any daily medications anymore.  States up until abdominal onset patient was in normal state of health.  Patient performs ADLs, ambulates without difficulty at home.  Lives with family.  No known history of TIA/CVA in the past.  States she is no longer feeling dizzy but still feels subjective numbness, tingling on left side of body.  Denies fever, headache, vision change, chest pain, shortness of breath, abdominal pain, nausea, vomiting, diarrhea, extremity edema, rash.  No recent foreign travel.    Gen: No acute distress  HEENT: EOMI, no nasal discharge, mucous membranes moist  CV: RRR, +S1/S2, no M/R/G, 2+ radial pulses b/l  Resp: CTAB, no W/R/R, no accessory muscle use, no increased work of breathing  GI: Abdomen soft non-distended, NTTP  MSK: No open wounds, no bruising, no LE edema  Neuro: Subjective decreased sensation over left side of face and left upper ext. finger to nose intact. no  drift. strength intact all extremities. A&Ox4, following commands, moving all four extremities spontaneously  Psych: appropriate mood    In the emergency department patient hemodynamically stable, afebrile.  Patient well-appearing in no acute distress.  Stroke code called.  On exam patient with subjective decreased sensation over the left side of face and left upper extremity.  Patient noted to have fingerstick glucose of 64, given orange juice with repeat fingerstick of 87.  Still endorsing subjective numbness, tingling of left side of face and left upper extremity.  Patient with allergy to IV contrast, states she had a rash before in the past.  CT head noncon obtained.  Will need to be premedicated for CTAs of head and neck.  Will recheck fingersticks.  Symptoms likely secondary to hypoglycemia.  Possibly hypoglycemia secondary to hypothyroidism in the setting of missed medications for past month.  Will also obtain screening labs assess for infectious etiology.  Will discuss with neurology about further imaging.  Will reassess.  Disposition pending workup and discussion with neurology.

## 2024-12-17 NOTE — ED PROVIDER NOTE - PROGRESS NOTE DETAILS
Discussed with neurology resident.  Will forego CTA imaging given need for IV contrast premedication as patient will get inpatient MRIs.  Will admit to medicine for MRI, further workup of hypoglycemia.

## 2024-12-18 DIAGNOSIS — E11.9 TYPE 2 DIABETES MELLITUS WITHOUT COMPLICATIONS: ICD-10-CM

## 2024-12-18 DIAGNOSIS — Z98.84 BARIATRIC SURGERY STATUS: Chronic | ICD-10-CM

## 2024-12-18 DIAGNOSIS — R20.2 PARESTHESIA OF SKIN: ICD-10-CM

## 2024-12-18 DIAGNOSIS — I10 ESSENTIAL (PRIMARY) HYPERTENSION: ICD-10-CM

## 2024-12-18 DIAGNOSIS — E16.2 HYPOGLYCEMIA, UNSPECIFIED: ICD-10-CM

## 2024-12-18 DIAGNOSIS — E03.9 HYPOTHYROIDISM, UNSPECIFIED: ICD-10-CM

## 2024-12-18 DIAGNOSIS — Z29.9 ENCOUNTER FOR PROPHYLACTIC MEASURES, UNSPECIFIED: ICD-10-CM

## 2024-12-18 LAB
A1C WITH ESTIMATED AVERAGE GLUCOSE RESULT: 5.1 % — SIGNIFICANT CHANGE UP (ref 4–5.6)
ADD ON TEST-SPECIMEN IN LAB: SIGNIFICANT CHANGE UP
APPEARANCE UR: CLEAR — SIGNIFICANT CHANGE UP
BACTERIA # UR AUTO: NEGATIVE /HPF — SIGNIFICANT CHANGE UP
BILIRUB UR-MCNC: NEGATIVE — SIGNIFICANT CHANGE UP
CAST: 1 /LPF — SIGNIFICANT CHANGE UP (ref 0–4)
COLOR SPEC: YELLOW — SIGNIFICANT CHANGE UP
CULTURE RESULTS: SIGNIFICANT CHANGE UP
DIFF PNL FLD: NEGATIVE — SIGNIFICANT CHANGE UP
ESTIMATED AVERAGE GLUCOSE: 100 — SIGNIFICANT CHANGE UP
GLUCOSE BLDC GLUCOMTR-MCNC: 101 MG/DL — HIGH (ref 70–99)
GLUCOSE BLDC GLUCOMTR-MCNC: 106 MG/DL — HIGH (ref 70–99)
GLUCOSE BLDC GLUCOMTR-MCNC: 119 MG/DL — HIGH (ref 70–99)
GLUCOSE BLDC GLUCOMTR-MCNC: 121 MG/DL — HIGH (ref 70–99)
GLUCOSE BLDC GLUCOMTR-MCNC: 82 MG/DL — SIGNIFICANT CHANGE UP (ref 70–99)
GLUCOSE BLDC GLUCOMTR-MCNC: 82 MG/DL — SIGNIFICANT CHANGE UP (ref 70–99)
GLUCOSE UR QL: NEGATIVE MG/DL — SIGNIFICANT CHANGE UP
KETONES UR-MCNC: NEGATIVE MG/DL — SIGNIFICANT CHANGE UP
LEUKOCYTE ESTERASE UR-ACNC: ABNORMAL
NITRITE UR-MCNC: NEGATIVE — SIGNIFICANT CHANGE UP
PH UR: 5.5 — SIGNIFICANT CHANGE UP (ref 5–8)
PROT UR-MCNC: NEGATIVE MG/DL — SIGNIFICANT CHANGE UP
RBC CASTS # UR COMP ASSIST: 0 /HPF — SIGNIFICANT CHANGE UP (ref 0–4)
SP GR SPEC: 1.01 — SIGNIFICANT CHANGE UP (ref 1–1.03)
SPECIMEN SOURCE: SIGNIFICANT CHANGE UP
SQUAMOUS # UR AUTO: 4 /HPF — SIGNIFICANT CHANGE UP (ref 0–5)
UROBILINOGEN FLD QL: 1 MG/DL — SIGNIFICANT CHANGE UP (ref 0.2–1)
WBC UR QL: 8 /HPF — HIGH (ref 0–5)

## 2024-12-18 PROCEDURE — 99223 1ST HOSP IP/OBS HIGH 75: CPT

## 2024-12-18 PROCEDURE — 93970 EXTREMITY STUDY: CPT | Mod: 26

## 2024-12-18 PROCEDURE — 70549 MR ANGIOGRAPH NECK W/O&W/DYE: CPT | Mod: 26

## 2024-12-18 PROCEDURE — 70553 MRI BRAIN STEM W/O & W/DYE: CPT | Mod: 26

## 2024-12-18 RX ORDER — ENOXAPARIN SODIUM 30 MG/.3ML
40 INJECTION SUBCUTANEOUS EVERY 24 HOURS
Refills: 0 | Status: DISCONTINUED | OUTPATIENT
Start: 2024-12-18 | End: 2024-12-20

## 2024-12-18 RX ORDER — GLUCAGON INJECTION, SOLUTION 0.5 MG/.1ML
1 INJECTION, SOLUTION SUBCUTANEOUS ONCE
Refills: 0 | Status: DISCONTINUED | OUTPATIENT
Start: 2024-12-18 | End: 2024-12-20

## 2024-12-18 RX ORDER — INFLUENZA VIRUS VACCINE 15; 15; 15; 15 UG/.5ML; UG/.5ML; UG/.5ML; UG/.5ML
0.5 SUSPENSION INTRAMUSCULAR ONCE
Refills: 0 | Status: DISCONTINUED | OUTPATIENT
Start: 2024-12-18 | End: 2024-12-20

## 2024-12-18 RX ORDER — PREDNISOLONE ACETATE 1.2 MG/ML
1 SUSPENSION/ DROPS OPHTHALMIC
Refills: 0 | DISCHARGE

## 2024-12-18 RX ORDER — 0.9 % SODIUM CHLORIDE 0.9 %
1000 INTRAVENOUS SOLUTION INTRAVENOUS
Refills: 0 | Status: DISCONTINUED | OUTPATIENT
Start: 2024-12-18 | End: 2024-12-20

## 2024-12-18 RX ORDER — PREDNISOLONE ACETATE 1.2 MG/ML
1 SUSPENSION/ DROPS OPHTHALMIC
Refills: 0 | Status: DISCONTINUED | OUTPATIENT
Start: 2024-12-18 | End: 2024-12-20

## 2024-12-18 RX ADMIN — PREDNISOLONE ACETATE 1 DROP(S): 1.2 SUSPENSION/ DROPS OPHTHALMIC at 06:27

## 2024-12-18 RX ADMIN — ENOXAPARIN SODIUM 40 MILLIGRAM(S): 30 INJECTION SUBCUTANEOUS at 12:10

## 2024-12-18 RX ADMIN — PREDNISOLONE ACETATE 1 DROP(S): 1.2 SUSPENSION/ DROPS OPHTHALMIC at 17:57

## 2024-12-18 NOTE — PROVIDER CONTACT NOTE (OTHER) - SITUATION
Upon assessment, patient left eye is fixed. Patient stated she has history of glaucoma and multiple surgeries to left eye

## 2024-12-18 NOTE — PROGRESS NOTE PEDS - SUBJECTIVE AND OBJECTIVE BOX
Madonna Peraza  Hospitalist  Pager- [phone]      PROGRESS NOTE:     Patient is a 79y old  Female who presents with a chief complaint of paresthesias (18 Dec 2024 03:24)      SUBJECTIVE / OVERNIGHT EVENTS: Pt seen and examined by me. Son at bedside  Reports her symptoms resolved when she came to the hospital( she came in with tingling in her left arm  No new symptoms  Son asking when the MRI will be done     ADDITIONAL REVIEW OF SYSTEMS:    MEDICATIONS  (STANDING):  dextrose 5%. 1000 milliLiter(s) (50 mL/Hr) IV Continuous <Continuous>  dextrose 5%. 1000 milliLiter(s) (100 mL/Hr) IV Continuous <Continuous>  dextrose 50% Injectable 25 Gram(s) IV Push once  dextrose 50% Injectable 12.5 Gram(s) IV Push once  dextrose 50% Injectable 25 Gram(s) IV Push once  enoxaparin Injectable 40 milliGRAM(s) SubCutaneous every 24 hours  glucagon  Injectable 1 milliGRAM(s) IntraMuscular once  influenza  Vaccine (HIGH DOSE) 0.5 milliLiter(s) IntraMuscular once  prednisoLONE acetate 1% Suspension 1 Drop(s) Left EYE two times a day    MEDICATIONS  (PRN):  dextrose Oral Gel 15 Gram(s) Oral once PRN Blood Glucose LESS THAN 70 milliGRAM(s)/deciliter      CAPILLARY BLOOD GLUCOSE  64 (17 Dec 2024 23:09)    POCT Blood Glucose.: 101 mg/dL (18 Dec 2024 12:37)  POCT Blood Glucose.: 106 mg/dL (18 Dec 2024 08:30)  POCT Blood Glucose.: 119 mg/dL (18 Dec 2024 05:50)  POCT Blood Glucose.: 121 mg/dL (18 Dec 2024 04:28)  POCT Blood Glucose.: 129 mg/dL (18 Dec 2024 01:49)  POCT Blood Glucose.: 86 mg/dL (18 Dec 2024 00:28)  POCT Blood Glucose.: 87 mg/dL (17 Dec 2024 23:31)  POCT Blood Glucose.: 66 mg/dL (17 Dec 2024 23:15)  POCT Blood Glucose.: 64 mg/dL (17 Dec 2024 22:52)    I&O's Summary      PHYSICAL EXAM:  Vital Signs Last 24 Hrs  T(C): 36.7 (18 Dec 2024 13:00), Max: 36.7 (18 Dec 2024 04:21)  T(F): 98.1 (18 Dec 2024 13:00), Max: 98.1 (18 Dec 2024 04:21)  HR: 65 (18 Dec 2024 13:00) (61 - 74)  BP: 116/69 (18 Dec 2024 13:00) (116/69 - 155/83)  BP(mean): --  RR: 18 (18 Dec 2024 13:00) (18 - 18)  SpO2: 100% (18 Dec 2024 13:00) (98% - 100%)    Parameters below as of 18 Dec 2024 13:00  Patient On (Oxygen Delivery Method): room air        CONSTITUTIONAL: NAD, well-developed  RESPIRATORY: Normal respiratory effort; lungs are clear to auscultation bilaterally  CARDIOVASCULAR: Regular rate and rhythm, normal S1 and S2, no murmur/rub/gallop; No lower extremity edema; Peripheral pulses are 2+ bilaterally  ABDOMEN: Nontender to palpation, normoactive bowel sounds, no rebound/guarding; No hepatosplenomegaly  MUSCLOSKELETAL: no clubbing or cyanosis of digits; no joint swelling or tenderness to palpation  PSYCH: A+O to person, place, and time; affect appropriate  NEURO: AAOx 3, speech fluent, ? mild right facial droop present. Strength- 5/5  SKIN: No rash   Left thigh lipoma present     LABS:                        11.7   4.85  )-----------( 259      ( 17 Dec 2024 23:04 )             36.6     12-17    140  |  106  |  19  ----------------------------<  67[L]  4.6   |  23  |  0.78    Ca    9.3      17 Dec 2024 23:04    TPro  7.3  /  Alb  3.9  /  TBili  <0.2  /  DBili  x   /  AST  26  /  ALT  15  /  AlkPhos  134[H]  12-17    PT/INR - ( 17 Dec 2024 23:04 )   PT: 10.6 sec;   INR: 0.91 ratio         PTT - ( 17 Dec 2024 23:04 )  PTT:25.9 sec      Urinalysis Basic - ( 17 Dec 2024 23:51 )    Color: Yellow / Appearance: Clear / S.012 / pH: x  Gluc: x / Ketone: Negative mg/dL  / Bili: Negative / Urobili: 1.0 mg/dL   Blood: x / Protein: Negative mg/dL / Nitrite: Negative   Leuk Esterase: Moderate / RBC: 0 /HPF / WBC 8 /HPF   Sq Epi: x / Non Sq Epi: 4 /HPF / Bacteria: Negative /HPF        Urinalysis with Rflx Culture (collected 17 Dec 2024 23:51)        RADIOLOGY & ADDITIONAL TESTS:  Results Reviewed:   Imaging Personally Reviewed:  Electrocardiogram Personally Reviewed:    COORDINATION OF CARE:  Care Discussed with Consultants/Other Providers [Y/N]:  Prior or Outpatient Records Reviewed [Y/N]:

## 2024-12-18 NOTE — PHYSICAL THERAPY INITIAL EVALUATION ADULT - NSPTDISCHREC_GEN_A_CORE
anticipate no skilled PT needs , please check further PT notes for ambulation assessment - CM made aware.

## 2024-12-18 NOTE — H&P ADULT - NSICDXPASTSURGICALHX_GEN_ALL_CORE_FT
PAST SURGICAL HISTORY:  History of cholecystectomy      PAST SURGICAL HISTORY:  H/O gastric bypass     History of cholecystectomy

## 2024-12-18 NOTE — PHYSICAL THERAPY INITIAL EVALUATION ADULT - PATIENT PROFILE REVIEW, REHAB EVAL
activity order- out of bed with assistance , ok for PT evaluation for out of bed activities as tolerated per RN Fabi/yes

## 2024-12-18 NOTE — H&P ADULT - NSHPREVIEWOFSYSTEMS_GEN_ALL_CORE
REVIEW OF SYSTEMS:    CONSTITUTIONAL: No weakness, fevers or chills  EYES/ENT: No visual changes;  No vertigo or throat pain   NECK: No pain or stiffness  RESPIRATORY: No cough, wheezing, hemoptysis; No shortness of breath  CARDIOVASCULAR: No chest pain or palpitations  GASTROINTESTINAL: No abdominal or epigastric pain. No nausea, vomiting, or hematemesis; No diarrhea or constipation. No melena or hematochezia.  GENITOURINARY: No dysuria, frequency or hematuria  NEUROLOGICAL: + left sided numbness. No weakness  SKIN: No itching, rashes REVIEW OF SYSTEMS:    CONSTITUTIONAL: No weakness, fevers or chills  EYES/ENT: No visual changes;  No vertigo or throat pain   NECK: No pain or stiffness  RESPIRATORY: No cough, wheezing, hemoptysis; No shortness of breath  CARDIOVASCULAR: No chest pain or palpitations  GASTROINTESTINAL: No abdominal or epigastric pain. No nausea, vomiting, or hematemesis; No diarrhea or constipation. No melena or hematochezia.  GENITOURINARY: No dysuria, frequency or hematuria  NEUROLOGICAL: + left upper extremity numbness. No weakness  SKIN: No itching, rashes

## 2024-12-18 NOTE — PROGRESS NOTE PEDS - PROBLEM SELECTOR PLAN 1
Present with left-sided headache, hand cramping, dizziness, and rapid breathing, low BS to 64 on admission   Symptoms began to improve after 15 minutes, had additional episode which also resolved, but continued to experience left sided numbness, left head and neck pain  s/p stroke code in ED.    CT head unremarkable. CTA not performed due to contrast allergy.    Per neurology, low suspicion for TIA/stroke, symptoms likely due to hypoglycemia however recommend MR head, MRA head and neck.  No indication for antiplatelet therapy at this time  BS improving, 120s now   FU MRI brain and MRA head and neck      - f/u MRIs, pt states she had MRI w/ contrast in August w/o any allergic reactions  - q4h neuro checks  - monitor glucose  - f/u neuro recs

## 2024-12-18 NOTE — H&P ADULT - NSHPLABSRESULTS_GEN_ALL_CORE
.  LABS:                         11.7   4.85  )-----------( 259      ( 17 Dec 2024 23:04 )             36.6     12-    140  |  106  |  19  ----------------------------<  67[L]  4.6   |  23  |  0.78    Ca    9.3      17 Dec 2024 23:04    TPro  7.3  /  Alb  3.9  /  TBili  <0.2  /  DBili  x   /  AST  26  /  ALT  15  /  AlkPhos  134[H]  12-17    PT/INR - ( 17 Dec 2024 23:04 )   PT: 10.6 sec;   INR: 0.91 ratio         PTT - ( 17 Dec 2024 23:04 )  PTT:25.9 sec  Urinalysis Basic - ( 17 Dec 2024 23:51 )    Color: Yellow / Appearance: Clear / S.012 / pH: x  Gluc: x / Ketone: Negative mg/dL  / Bili: Negative / Urobili: 1.0 mg/dL   Blood: x / Protein: Negative mg/dL / Nitrite: Negative   Leuk Esterase: Moderate / RBC: 0 /HPF / WBC 8 /HPF   Sq Epi: x / Non Sq Epi: 4 /HPF / Bacteria: Negative /HPF                RADIOLOGY, EKG & ADDITIONAL TESTS: Reviewed.

## 2024-12-18 NOTE — PHYSICAL THERAPY INITIAL EVALUATION ADULT - LEVEL OF INDEPENDENCE: GAIT, REHAB EVAL
deferred due to patient with c/o left calf pain during activities and on palpation , RN and ACP made aware./unable to perform
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
## 2024-12-18 NOTE — ED ADULT NURSE REASSESSMENT NOTE - NS ED NURSE REASSESS COMMENT FT1
pt remains A&Ox4 offering no complaints at this time. remains on continuous monitor, NSR noted. respirations even and unlabored.  at this time. VS as noted. denies weakness, numbness, pain, HA. no acute distress noted. awaiting transport to bed assignment. safety maintained, side rails up

## 2024-12-18 NOTE — CONSULT NOTE ADULT - ATTENDING COMMENTS
79F PMHx HTN, HLD, hypothyroidism, DM (not on medications) who presented as code stroke due to left side numbness. Episode was associated with left-sided headache, hand cramping, dizziness  NEuro exam now non-focal  CTH-      transient symptoms in setting of hypoglycemia vs less likely TIA    MRI Brain  A1c, LDL

## 2024-12-18 NOTE — H&P ADULT - PROBLEM SELECTOR PLAN 2
Pt found to be hypoglycemic to 67. Possibly in setting of poor PO intake, pt states she only eats 1 meal a day. Improvement of glucose s/p juice. Pt states she has been off diabetes medications since gastric bypass surgery.  - continue to monitor glucose  - hypoglycemia protocol  - encourage PO intake

## 2024-12-18 NOTE — ED ADULT NURSE REASSESSMENT NOTE - NS ED NURSE REASSESS RESPONSE TO CARE
Pt presently denies feeling any numbness in left side. Pt st" I feel much better now that my sugars are better. "/feeling better

## 2024-12-18 NOTE — OCCUPATIONAL THERAPY INITIAL EVALUATION ADULT - PERTINENT HX OF CURRENT PROBLEM, REHAB EVAL
79-year-old female with history of HTN, HLD, hypothyroidism, DM (not currently on medications), s/p gastric bypass surgery who presented to the ED with complaint of left sided numbness. CT brain negative for acute abnormality. Found to be hypoglycemic upon arrival. Admitted to r/o CVA.

## 2024-12-18 NOTE — PROGRESS NOTE PEDS - PROBLEM SELECTOR PLAN 3
Pt states she has been off synthroid x 1 months. She states she self d/meredith because she did not want to be taking daily medications. TSH mildly elevated at 4.75, free T4 and T3 wnl.  Continue to monitor

## 2024-12-18 NOTE — OCCUPATIONAL THERAPY INITIAL EVALUATION ADULT - GENERAL OBSERVATIONS, REHAB EVAL
Patient received semisupine in bed in NAD; agreeable to participate in OT evaluation. +telemetry monitor. HR 61 bpm. Family at bedside. Patient c/o left calf pain, worse with movement and palpation. SABINA Dunlap and SHIRA Pugh made aware.

## 2024-12-18 NOTE — PATIENT PROFILE ADULT - FALL HARM RISK - HARM RISK INTERVENTIONS
Assistance OOB with selected safe patient handling equipment/Communicate Risk of Fall with Harm to all staff/Monitor for mental status changes/Monitor gait and stability/Provide patient with walking aids - walker, cane, crutches/Reinforce activity limits and safety measures with patient and family/Tailored Fall Risk Interventions/Use of alarms - bed, chair and/or voice tab/Bed in lowest position, wheels locked, appropriate side rails in place/Call bell, personal items and telephone in reach/Instruct patient to call for assistance before getting out of bed or chair/Non-slip footwear when patient is out of bed/[place] to call system/Physically safe environment - no spills, clutter or unnecessary equipment/Purposeful Proactive Rounding/Room/bathroom lighting operational, light cord in reach

## 2024-12-18 NOTE — OCCUPATIONAL THERAPY INITIAL EVALUATION ADULT - LIVES WITH, PROFILE
Patient lives in a house with family with 4 steps + 1 step to enter house, remains on the 1st floor inside.

## 2024-12-18 NOTE — H&P ADULT - PROBLEM SELECTOR PLAN 1
s/p stroke code in ED. CTH w/o acute findings. Blood glucose 67. Symptoms resolved s/p juice. Pt seen and evaluated by neurology, low suspicion for TIA/stroke and likely due to hypoglycemia however recommend MR head, MRA head and neck.  - f/u MRIs, pt states she had MRI w/ contrast in August w/o any allergic reactions  - q4h neuro checks  - monitor glucose s/p stroke code in ED. CTH w/o acute findings. Blood glucose 67. Symptoms resolved s/p juice. Pt seen and evaluated by neurology, low suspicion for TIA/stroke and likely due to hypoglycemia however recommend MR head, MRA head and neck.  - f/u MRIs, pt states she had MRI w/ contrast in August w/o any allergic reactions  - q4h neuro checks  - monitor glucose  - f/u neuro recs

## 2024-12-18 NOTE — ED ADULT NURSE REASSESSMENT NOTE - STATUS
as per handoff rpt pt code stroke c/o left sided numbness weakness of arm & leg pt also treated for hypogycemia.

## 2024-12-18 NOTE — H&P ADULT - NSHPPHYSICALEXAM_GEN_ALL_CORE
VITAL SIGNS:  T(C): 36.5 (12-18-24 @ 00:30), Max: 36.5 (12-18-24 @ 00:30)  T(F): 97.7 (12-18-24 @ 00:30), Max: 97.7 (12-18-24 @ 00:30)  HR: 61 (12-18-24 @ 00:30) (61 - 70)  BP: 155/73 (12-18-24 @ 00:30) (151/82 - 155/73)  BP(mean): --  RR: 18 (12-18-24 @ 00:30) (18 - 18)  SpO2: 99% (12-18-24 @ 00:30) (99% - 99%)  Wt(kg): --    PHYSICAL EXAM:    Constitutional: resting comfortably in bed; NAD  Head: NC/AT  Eyes: PERRL, EOMI, anicteric sclera  ENT: no nasal discharge; uvula midline, no oropharyngeal erythema or exudates; MMM  Neck: supple  Respiratory: CTA B/L; no W/R/R, no retractions  Cardiac: +S1/S2; RRR; no M/R/G  Gastrointestinal: abdomen soft, NT/ND; no rebound or guarding; +BSx4  Back: spine midline, no bony tenderness  Extremities: WWP, no clubbing or cyanosis; no peripheral edema  Musculoskeletal: NROM x4; no joint swelling, tenderness or erythema  Vascular: distal pulses intact  Dermatologic: skin warm, dry and intact; no rashes  Lymphatic: no submandibular or cervical LAD  Neurologic: AAOx3; moves all 4 extremities  Psychiatric: affect and characteristics of appearance, verbalizations, behaviors are appropriate VITAL SIGNS:  T(C): 36.5 (12-18-24 @ 00:30), Max: 36.5 (12-18-24 @ 00:30)  T(F): 97.7 (12-18-24 @ 00:30), Max: 97.7 (12-18-24 @ 00:30)  HR: 61 (12-18-24 @ 00:30) (61 - 70)  BP: 155/73 (12-18-24 @ 00:30) (151/82 - 155/73)  BP(mean): --  RR: 18 (12-18-24 @ 00:30) (18 - 18)  SpO2: 99% (12-18-24 @ 00:30) (99% - 99%)  Wt(kg): --    PHYSICAL EXAM:    Constitutional: resting comfortably in bed; NAD  Head: NC/AT  Eyes: PERRL, EOMI, anicteric sclera  ENT: no nasal discharge; uvula midline, no oropharyngeal erythema or exudates; MMM  Neck: supple, full ROM  Respiratory: CTA B/L; no W/R/R, no retractions  Cardiac: +S1/S2; RRR; no M/R/G  Gastrointestinal: abdomen soft, NT/ND; no rebound or guarding; +BSx4  Back: spine midline, no bony tenderness  Extremities: WWP, no clubbing or cyanosis; no peripheral edema  Musculoskeletal: no joint swelling, tenderness or erythema  Vascular: distal pulses intact  Dermatologic: skin warm, dry and intact; no rashes  Lymphatic: no submandibular or cervical LAD  Neurologic: AAOx3; moves all 4 extremities; strength 5/5 b/l UE, 5/5 LLE, 4/5 RLE. sensation, equal and intact to light touch to b/l UE and LE.   Psychiatric: affect and characteristics of appearance, verbalizations, behaviors are appropriate

## 2024-12-18 NOTE — H&P ADULT - PROBLEM SELECTOR PLAN 5
Left message for patient to call back.  When patient returns call, phone nurse please relay below:    Please let him know that the results letter that was mailed on 2/15/2018 for his colonoscopy 2/9/2018 was returned with a sticker stating \"vacant, unable to forward\".  (see results letter and review with patient).  Please obtain his current address and update it in EP[employer].  Once new address is updated, the letter can be re-mailed to him.  
Message relayed.  Pt states that is his current adress  
DVT prophylaxis: lovenox  Diet: dash/tlc

## 2024-12-18 NOTE — H&P ADULT - PROBLEM SELECTOR PLAN 3
Pt states she has been off synthroid x 1 months. She states she self d/meredith because she did not want to be taking daily medications. TSH mildly elevated at 4.75, free T4 and T3 wnl.  - continue to monitor

## 2024-12-18 NOTE — H&P ADULT - ASSESSMENT
Pt is a 79-year-old female with PMHx HTN, HLD, hypothyroidism, DM (not currently on medications), s/p gastric bypass surgery who presented to the ED w/ complaint of left sided numbness.

## 2024-12-18 NOTE — PHYSICAL THERAPY INITIAL EVALUATION ADULT - PERTINENT HX OF CURRENT PROBLEM, REHAB EVAL
This is a 79-year-old female with PMHx  DM, s/p gastric bypass surgery who presented to the ED w/ complaint of left sided numbness.

## 2024-12-19 ENCOUNTER — TRANSCRIPTION ENCOUNTER (OUTPATIENT)
Age: 79
End: 2024-12-19

## 2024-12-19 LAB
ANION GAP SERPL CALC-SCNC: 8 MMOL/L — SIGNIFICANT CHANGE UP (ref 7–14)
BASOPHILS # BLD AUTO: 0.03 K/UL — SIGNIFICANT CHANGE UP (ref 0–0.2)
BASOPHILS NFR BLD AUTO: 0.9 % — SIGNIFICANT CHANGE UP (ref 0–2)
BUN SERPL-MCNC: 13 MG/DL — SIGNIFICANT CHANGE UP (ref 7–23)
CALCIUM SERPL-MCNC: 9.1 MG/DL — SIGNIFICANT CHANGE UP (ref 8.4–10.5)
CHLORIDE SERPL-SCNC: 108 MMOL/L — HIGH (ref 98–107)
CO2 SERPL-SCNC: 25 MMOL/L — SIGNIFICANT CHANGE UP (ref 22–31)
CREAT SERPL-MCNC: 0.63 MG/DL — SIGNIFICANT CHANGE UP (ref 0.5–1.3)
EGFR: 90 ML/MIN/1.73M2 — SIGNIFICANT CHANGE UP
EOSINOPHIL # BLD AUTO: 0.06 K/UL — SIGNIFICANT CHANGE UP (ref 0–0.5)
EOSINOPHIL NFR BLD AUTO: 1.8 % — SIGNIFICANT CHANGE UP (ref 0–6)
GLUCOSE BLDC GLUCOMTR-MCNC: 128 MG/DL — HIGH (ref 70–99)
GLUCOSE BLDC GLUCOMTR-MCNC: 158 MG/DL — HIGH (ref 70–99)
GLUCOSE BLDC GLUCOMTR-MCNC: 67 MG/DL — LOW (ref 70–99)
GLUCOSE BLDC GLUCOMTR-MCNC: 68 MG/DL — LOW (ref 70–99)
GLUCOSE BLDC GLUCOMTR-MCNC: 71 MG/DL — SIGNIFICANT CHANGE UP (ref 70–99)
GLUCOSE BLDC GLUCOMTR-MCNC: 83 MG/DL — SIGNIFICANT CHANGE UP (ref 70–99)
GLUCOSE BLDC GLUCOMTR-MCNC: 96 MG/DL — SIGNIFICANT CHANGE UP (ref 70–99)
GLUCOSE SERPL-MCNC: 85 MG/DL — SIGNIFICANT CHANGE UP (ref 70–99)
HCT VFR BLD CALC: 34.9 % — SIGNIFICANT CHANGE UP (ref 34.5–45)
HGB BLD-MCNC: 11 G/DL — LOW (ref 11.5–15.5)
IANC: 1.18 K/UL — LOW (ref 1.8–7.4)
IMM GRANULOCYTES NFR BLD AUTO: 0 % — SIGNIFICANT CHANGE UP (ref 0–0.9)
LYMPHOCYTES # BLD AUTO: 1.86 K/UL — SIGNIFICANT CHANGE UP (ref 1–3.3)
LYMPHOCYTES # BLD AUTO: 54.4 % — HIGH (ref 13–44)
MAGNESIUM SERPL-MCNC: 2.2 MG/DL — SIGNIFICANT CHANGE UP (ref 1.6–2.6)
MCHC RBC-ENTMCNC: 30.3 PG — SIGNIFICANT CHANGE UP (ref 27–34)
MCHC RBC-ENTMCNC: 31.5 G/DL — LOW (ref 32–36)
MCV RBC AUTO: 96.1 FL — SIGNIFICANT CHANGE UP (ref 80–100)
MONOCYTES # BLD AUTO: 0.29 K/UL — SIGNIFICANT CHANGE UP (ref 0–0.9)
MONOCYTES NFR BLD AUTO: 8.5 % — SIGNIFICANT CHANGE UP (ref 2–14)
NEUTROPHILS # BLD AUTO: 1.18 K/UL — LOW (ref 1.8–7.4)
NEUTROPHILS NFR BLD AUTO: 34.4 % — LOW (ref 43–77)
NRBC # BLD: 0 /100 WBCS — SIGNIFICANT CHANGE UP (ref 0–0)
NRBC # FLD: 0 K/UL — SIGNIFICANT CHANGE UP (ref 0–0)
PHOSPHATE SERPL-MCNC: 3.6 MG/DL — SIGNIFICANT CHANGE UP (ref 2.5–4.5)
PLATELET # BLD AUTO: 228 K/UL — SIGNIFICANT CHANGE UP (ref 150–400)
POTASSIUM SERPL-MCNC: 4.7 MMOL/L — SIGNIFICANT CHANGE UP (ref 3.5–5.3)
POTASSIUM SERPL-SCNC: 4.7 MMOL/L — SIGNIFICANT CHANGE UP (ref 3.5–5.3)
RBC # BLD: 3.63 M/UL — LOW (ref 3.8–5.2)
RBC # FLD: 13.7 % — SIGNIFICANT CHANGE UP (ref 10.3–14.5)
SODIUM SERPL-SCNC: 141 MMOL/L — SIGNIFICANT CHANGE UP (ref 135–145)
WBC # BLD: 3.42 K/UL — LOW (ref 3.8–10.5)
WBC # FLD AUTO: 3.42 K/UL — LOW (ref 3.8–10.5)

## 2024-12-19 PROCEDURE — 99232 SBSQ HOSP IP/OBS MODERATE 35: CPT

## 2024-12-19 PROCEDURE — 99223 1ST HOSP IP/OBS HIGH 75: CPT

## 2024-12-19 RX ORDER — POLYETHYLENE GLYCOL 3350 17 G/17G
17 POWDER, FOR SOLUTION ORAL DAILY
Refills: 0 | Status: DISCONTINUED | OUTPATIENT
Start: 2024-12-19 | End: 2024-12-20

## 2024-12-19 RX ADMIN — PREDNISOLONE ACETATE 1 DROP(S): 1.2 SUSPENSION/ DROPS OPHTHALMIC at 18:55

## 2024-12-19 RX ADMIN — POLYETHYLENE GLYCOL 3350 17 GRAM(S): 17 POWDER, FOR SOLUTION ORAL at 12:34

## 2024-12-19 RX ADMIN — PREDNISOLONE ACETATE 1 DROP(S): 1.2 SUSPENSION/ DROPS OPHTHALMIC at 05:21

## 2024-12-19 RX ADMIN — ENOXAPARIN SODIUM 40 MILLIGRAM(S): 30 INJECTION SUBCUTANEOUS at 18:55

## 2024-12-19 RX ADMIN — Medication 12.5 GRAM(S): at 12:30

## 2024-12-19 NOTE — PROGRESS NOTE ADULT - SUBJECTIVE AND OBJECTIVE BOX
Madonna Peraza  Hospitalist  Pager- [phone]      PROGRESS NOTE:     Patient is a 79y old  Female who presents with a chief complaint of paresthesias (18 Dec 2024 03:24)      SUBJECTIVE / OVERNIGHT EVENTS: Pt seen and examined by me. Son at bedside  Reports her symptoms resolved when she came to the hospital( she came in with tingling in her left arm  No new symptoms  Son asking when the MRI will be done     ADDITIONAL REVIEW OF SYSTEMS:    MEDICATIONS  (STANDING):  dextrose 5%. 1000 milliLiter(s) (50 mL/Hr) IV Continuous <Continuous>  dextrose 5%. 1000 milliLiter(s) (100 mL/Hr) IV Continuous <Continuous>  dextrose 50% Injectable 25 Gram(s) IV Push once  dextrose 50% Injectable 12.5 Gram(s) IV Push once  dextrose 50% Injectable 25 Gram(s) IV Push once  enoxaparin Injectable 40 milliGRAM(s) SubCutaneous every 24 hours  glucagon  Injectable 1 milliGRAM(s) IntraMuscular once  influenza  Vaccine (HIGH DOSE) 0.5 milliLiter(s) IntraMuscular once  polyethylene glycol 3350 17 Gram(s) Oral daily  prednisoLONE acetate 1% Suspension 1 Drop(s) Left EYE two times a day    MEDICATIONS  (PRN):  dextrose Oral Gel 15 Gram(s) Oral once PRN Blood Glucose LESS THAN 70 milliGRAM(s)/deciliter    CAPILLARY BLOOD GLUCOSE      POCT Blood Glucose.: 83 mg/dL (19 Dec 2024 08:29)  POCT Blood Glucose.: 96 mg/dL (19 Dec 2024 00:21)  POCT Blood Glucose.: 82 mg/dL (18 Dec 2024 22:32)  POCT Blood Glucose.: 82 mg/dL (18 Dec 2024 17:56)  POCT Blood Glucose.: 101 mg/dL (18 Dec 2024 12:37)    I&O's Summary      PHYSICAL EXAM:    Vital Signs Last 24 Hrs  T(C): 36.7 (19 Dec 2024 07:55), Max: 36.8 (19 Dec 2024 04:00)  T(F): 98 (19 Dec 2024 07:55), Max: 98.2 (19 Dec 2024 04:00)  HR: 62 (19 Dec 2024 07:55) (60 - 72)  BP: 136/55 (19 Dec 2024 07:55) (116/69 - 149/73)  BP(mean): --  RR: 17 (19 Dec 2024 07:55) (17 - 18)  SpO2: 100% (19 Dec 2024 07:55) (98% - 100%)    Parameters below as of 19 Dec 2024 07:55  Patient On (Oxygen Delivery Method): room air        CONSTITUTIONAL: NAD, well-developed  RESPIRATORY: Normal respiratory effort; lungs are clear to auscultation bilaterally  CARDIOVASCULAR: Regular rate and rhythm, normal S1 and S2, no murmur/rub/gallop; No lower extremity edema; Peripheral pulses are 2+ bilaterally  ABDOMEN: Nontender to palpation, normoactive bowel sounds, no rebound/guarding; No hepatosplenomegaly  MUSCLOSKELETAL: no clubbing or cyanosis of digits; no joint swelling or tenderness to palpation  PSYCH: A+O to person, place, and time; affect appropriate  NEURO: AAOx 3, speech fluent, ? mild right facial droop present. Strength- 5/5  SKIN: No rash   Left thigh lipoma present     LABS:                                               11.0   3.42  )-----------( 228      ( 19 Dec 2024 04:53 )             34.9         12-    141  |  108[H]  |  13  ----------------------------<  85  4.7   |  25  |  0.63    Ca    9.1      19 Dec 2024 04:53  Phos  3.6     12-  Mg     2.20     -    TPro  7.3  /  Alb  3.9  /  TBili  <0.2  /  DBili  x   /  AST  26  /  ALT  15  /  AlkPhos  134[H]  12-17       PTT - ( 17 Dec 2024 23:04 )  PTT:25.9 sec      Urinalysis Basic - ( 17 Dec 2024 23:51 )    Color: Yellow / Appearance: Clear / S.012 / pH: x  Gluc: x / Ketone: Negative mg/dL  / Bili: Negative / Urobili: 1.0 mg/dL   Blood: x / Protein: Negative mg/dL / Nitrite: Negative   Leuk Esterase: Moderate / RBC: 0 /HPF / WBC 8 /HPF   Sq Epi: x / Non Sq Epi: 4 /HPF / Bacteria: Negative /HPF        Urinalysis with Rflx Culture (collected 17 Dec 2024 23:51)        RADIOLOGY & ADDITIONAL TESTS:  Results Reviewed:   Imaging Personally Reviewed:  Electrocardiogram Personally Reviewed:    COORDINATION OF CARE:  Care Discussed with Consultants/Other Providers [Y/N]:  Prior or Outpatient Records Reviewed [Y/N]:   Madonna Peraza  Hospitalist  Pager- [phone]      PROGRESS NOTE:     Patient is a 79y old  Female who presents with a chief complaint of paresthesias (18 Dec 2024 03:24)      SUBJECTIVE / OVERNIGHT EVENTS: Pt seen and examined by me. Grandchildren at  bedside  Tingling resolved  No new symptoms  Reviewed the results of the MRI with pt and family   Encouraged PO- per family, pt doesn't eat much- drinks mostly coffee and eats only one small meal a day      ADDITIONAL REVIEW OF SYSTEMS:    MEDICATIONS  (STANDING):  dextrose 5%. 1000 milliLiter(s) (50 mL/Hr) IV Continuous <Continuous>  dextrose 5%. 1000 milliLiter(s) (100 mL/Hr) IV Continuous <Continuous>  dextrose 50% Injectable 25 Gram(s) IV Push once  dextrose 50% Injectable 12.5 Gram(s) IV Push once  dextrose 50% Injectable 25 Gram(s) IV Push once  enoxaparin Injectable 40 milliGRAM(s) SubCutaneous every 24 hours  glucagon  Injectable 1 milliGRAM(s) IntraMuscular once  influenza  Vaccine (HIGH DOSE) 0.5 milliLiter(s) IntraMuscular once  polyethylene glycol 3350 17 Gram(s) Oral daily  prednisoLONE acetate 1% Suspension 1 Drop(s) Left EYE two times a day    MEDICATIONS  (PRN):  dextrose Oral Gel 15 Gram(s) Oral once PRN Blood Glucose LESS THAN 70 milliGRAM(s)/deciliter    CAPILLARY BLOOD GLUCOSE      POCT Blood Glucose.: 83 mg/dL (19 Dec 2024 08:29)  POCT Blood Glucose.: 96 mg/dL (19 Dec 2024 00:21)  POCT Blood Glucose.: 82 mg/dL (18 Dec 2024 22:32)  POCT Blood Glucose.: 82 mg/dL (18 Dec 2024 17:56)  POCT Blood Glucose.: 101 mg/dL (18 Dec 2024 12:37)    I&O's Summary      PHYSICAL EXAM:    Vital Signs Last 24 Hrs  T(C): 36.7 (19 Dec 2024 07:55), Max: 36.8 (19 Dec 2024 04:00)  T(F): 98 (19 Dec 2024 07:55), Max: 98.2 (19 Dec 2024 04:00)  HR: 62 (19 Dec 2024 07:55) (60 - 72)  BP: 136/55 (19 Dec 2024 07:55) (116/69 - 149/73)  BP(mean): --  RR: 17 (19 Dec 2024 07:55) (17 - 18)  SpO2: 100% (19 Dec 2024 07:55) (98% - 100%)    Parameters below as of 19 Dec 2024 07:55  Patient On (Oxygen Delivery Method): room air        CONSTITUTIONAL: NAD, well-developed  RESPIRATORY: Normal respiratory effort; lungs are clear to auscultation bilaterally  CARDIOVASCULAR: Regular rate and rhythm, normal S1 and S2, no murmur/rub/gallop; No lower extremity edema; Peripheral pulses are 2+ bilaterally  ABDOMEN: Nontender to palpation, normoactive bowel sounds, no rebound/guarding; No hepatosplenomegaly  MUSCLOSKELETAL: no clubbing or cyanosis of digits; no joint swelling or tenderness to palpation  PSYCH: A+O to person, place, and time; affect appropriate  NEURO: AAOx 3, speech fluent, ? mild right facial droop present. Strength- 5/5  SKIN: No rash   Left thigh lipoma present     LABS:                                               11.0   3.42  )-----------( 228      ( 19 Dec 2024 04:53 )             34.9         12-19    141  |  108[H]  |  13  ----------------------------<  85  4.7   |  25  |  0.63    Ca    9.1      19 Dec 2024 04:53  Phos  3.6     12-  Mg     2.20     12-    TPro  7.3  /  Alb  3.9  /  TBili  <0.2  /  DBili  x   /  AST  26  /  ALT  15  /  AlkPhos  134[H]  12-17       PTT - ( 17 Dec 2024 23:04 )  PTT:25.9 sec      Urinalysis Basic - ( 17 Dec 2024 23:51 )    Color: Yellow / Appearance: Clear / S.012 / pH: x  Gluc: x / Ketone: Negative mg/dL  / Bili: Negative / Urobili: 1.0 mg/dL   Blood: x / Protein: Negative mg/dL / Nitrite: Negative   Leuk Esterase: Moderate / RBC: 0 /HPF / WBC 8 /HPF   Sq Epi: x / Non Sq Epi: 4 /HPF / Bacteria: Negative /HPF        Urinalysis with Rflx Culture (collected 17 Dec 2024 23:51)        RADIOLOGY & ADDITIONAL TESTS:  Results Reviewed:   Imaging Personally Reviewed:  Electrocardiogram Personally Reviewed:    COORDINATION OF CARE:  Care Discussed with Consultants/Other Providers [Y/N]:  Prior or Outpatient Records Reviewed [Y/N]:

## 2024-12-19 NOTE — PROGRESS NOTE ADULT - PROBLEM SELECTOR PLAN 4
Pt states her PCP d/meredith BP meds, previously on amlodipine, losartan  Continue to monitor BPs Pt states her PCP d/meredith BP meds, previously on amlodipine, losartan  SBP in 120s  Continue to monitor BPs

## 2024-12-19 NOTE — PROVIDER CONTACT NOTE (HYPOGLYCEMIA EVENT) - NS PROVIDER CONTACT BACKGROUND-HYPO
Age: 79y    Gender: Female    POCT Blood Glucose:  158 mg/dL (12-19-24 @ 12:51)  68 mg/dL (12-19-24 @ 12:18)  67 mg/dL (12-19-24 @ 12:15)  83 mg/dL (12-19-24 @ 08:29)  96 mg/dL (12-19-24 @ 00:21)  82 mg/dL (12-18-24 @ 22:32)  82 mg/dL (12-18-24 @ 17:56)      eMAR:  dextrose 50% Injectable   12.5 Gram(s) IV Push (12-19-24 @ 12:30)

## 2024-12-19 NOTE — PROGRESS NOTE ADULT - SUBJECTIVE AND OBJECTIVE BOX
Patient seen and examined this am. No new events    MEDICATIONS:    dextrose 5%. 1000 milliLiter(s) IV Continuous <Continuous>  dextrose 5%. 1000 milliLiter(s) IV Continuous <Continuous>  dextrose 50% Injectable 25 Gram(s) IV Push once  dextrose 50% Injectable 12.5 Gram(s) IV Push once  dextrose 50% Injectable 25 Gram(s) IV Push once  dextrose Oral Gel 15 Gram(s) Oral once PRN  enoxaparin Injectable 40 milliGRAM(s) SubCutaneous every 24 hours  glucagon  Injectable 1 milliGRAM(s) IntraMuscular once  influenza  Vaccine (HIGH DOSE) 0.5 milliLiter(s) IntraMuscular once  polyethylene glycol 3350 17 Gram(s) Oral daily  prednisoLONE acetate 1% Suspension 1 Drop(s) Left EYE two times a day      LABS:                          11.0   3.42  )-----------( 228      ( 19 Dec 2024 04:53 )             34.9     12-19    141  |  108[H]  |  13  ----------------------------<  85  4.7   |  25  |  0.63    Ca    9.1      19 Dec 2024 04:53  Phos  3.6     12-19  Mg     2.20     12-19    TPro  7.3  /  Alb  3.9  /  TBili  <0.2  /  DBili  x   /  AST  26  /  ALT  15  /  AlkPhos  134[H]  12-17    CAPILLARY BLOOD GLUCOSE      POCT Blood Glucose.: 158 mg/dL (19 Dec 2024 12:51)  POCT Blood Glucose.: 68 mg/dL (19 Dec 2024 12:18)  POCT Blood Glucose.: 67 mg/dL (19 Dec 2024 12:15)  POCT Blood Glucose.: 83 mg/dL (19 Dec 2024 08:29)  POCT Blood Glucose.: 96 mg/dL (19 Dec 2024 00:21)  POCT Blood Glucose.: 82 mg/dL (18 Dec 2024 22:32)  POCT Blood Glucose.: 82 mg/dL (18 Dec 2024 17:56)    PT/INR - ( 17 Dec 2024 23:04 )   PT: 10.6 sec;   INR: 0.91 ratio         PTT - ( 17 Dec 2024 23:04 )  PTT:25.9 sec  Urinalysis Basic - ( 19 Dec 2024 04:53 )    Color: x / Appearance: x / SG: x / pH: x  Gluc: 85 mg/dL / Ketone: x  / Bili: x / Urobili: x   Blood: x / Protein: x / Nitrite: x   Leuk Esterase: x / RBC: x / WBC x   Sq Epi: x / Non Sq Epi: x / Bacteria: x      I&O's Summary    Vital Signs Last 24 Hrs  T(C): 36.7 (19 Dec 2024 07:55), Max: 36.8 (19 Dec 2024 04:00)  T(F): 98 (19 Dec 2024 07:55), Max: 98.2 (19 Dec 2024 04:00)  HR: 62 (19 Dec 2024 07:55) (60 - 72)  BP: 136/55 (19 Dec 2024 07:55) (129/62 - 149/73)  BP(mean): --  RR: 17 (19 Dec 2024 07:55) (17 - 18)  SpO2: 100% (19 Dec 2024 07:55) (98% - 100%)    Parameters below as of 19 Dec 2024 07:55  Patient On (Oxygen Delivery Method): room air      < from: MR Head w/wo IV Cont (12.18.24 @ 21:46) >  IMPRESSION:    MRI BRAIN: No acute infarction.    MRA NECK: Unremarkable.    --- End of Report ---      < end of copied text >

## 2024-12-19 NOTE — DISCHARGE NOTE PROVIDER - NSDCMRMEDTOKEN_GEN_ALL_CORE_FT
prednisoLONE ophthalmic: 1 drop(s) in the left eye 2 times a day   glucometer (per patient&#x27;s insurance): 1 application subcutaneously when you Feel symptoms of Low sugars (lightheadedness, Fatigue, Blurry Vision , sweating, dizziness, tingling, numbness, confusion etc)  glucose tablets: Follow instructions on bottle when sugar is low.  lancets: 1 application subcutaneously when you Feel symptoms of Low sugars (lightheadedness, Fatigue, Blurry Vision , sweating, dizziness, tingling, numbness, confusion etc)  prednisoLONE ophthalmic: 1 drop(s) in the left eye 2 times a day  test strips (per patient&#x27;s insurance): 1 application subcutaneously when you Feel symptoms of Low sugars (lightheadedness, Fatigue, Blurry Vision , sweating, dizziness, tingling, numbness, confusion etc)

## 2024-12-19 NOTE — PROGRESS NOTE ADULT - PROBLEM SELECTOR PLAN 5
DVT prophylaxis: lovenox  Diet: dash/tlc  PT- Anticipated no skilled PT needs  Fu final neuro recs DVT prophylaxis: lovenox  Diet: dash/tlc  PT- Anticipated no skilled PT needs  DC planning 34 mins

## 2024-12-19 NOTE — PROGRESS NOTE ADULT - PROBLEM SELECTOR PLAN 3
Pt states she has been off synthroid x 1 months. She states she self d/meredith because she did not want to be taking daily medications. TSH mildly elevated at 4.75, free T4 and T3 wnl.  Continue to monitor Pt states she has been off synthroid x 1 months. She states she self d/meredith because she did not want to be taking daily medications. TSH mildly elevated at 4.75, free T4 and T3 wnl.  Continue to monitor  Advise FU as outpt

## 2024-12-19 NOTE — PROVIDER CONTACT NOTE (HYPOGLYCEMIA EVENT) - NS PROVIDER CONTACT ASSESS-HYPO
Patient remain A&O x 4, asymptomatic. Denies any pain/discomfort. Breakfast consumption was 100%. VS in flowsheet, remain stable.
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
## 2024-12-19 NOTE — DISCHARGE NOTE PROVIDER - NSDCCPCAREPLAN_GEN_ALL_CORE_FT
PRINCIPAL DISCHARGE DIAGNOSIS  Diagnosis: Paresthesia  Assessment and Plan of Treatment: You  presented with left-sided headache, hand cramping, dizziness, and rapid breathing, your blood sugar on admission was 64 . Your symptoms began to improve. Your brain imaging was negative for acute stroke. you were seen by Neurology and your symptoms could be from low blood sugars from your poor oral intake. Please continue to eat frequent meals.Follow up with your doctor on discharge        PRINCIPAL DISCHARGE DIAGNOSIS  Diagnosis: Paresthesia  Assessment and Plan of Treatment: You  presented with left-sided headache, hand cramping, dizziness, and rapid breathing, your blood sugar on admission was 64 . Your symptoms began to improve. Your brain imaging was negative for acute stroke. you were seen by Neurology and your symptoms could be from low blood sugars from your poor oral intake. Please continue to eat frequent meals.Follow up with your doctor on discharge         SECONDARY DISCHARGE DIAGNOSES  Diagnosis: Hypoglycemia  Assessment and Plan of Treatment:      PRINCIPAL DISCHARGE DIAGNOSIS  Diagnosis: Paresthesia  Assessment and Plan of Treatment: You  presented with left-sided headache, hand cramping, dizziness, and rapid breathing, your blood sugar on admission was 64 . Your symptoms began to improve. Your brain imaging was negative for acute stroke. you were seen by Neurology and your symptoms could be from low blood sugars from your poor oral intake. Please continue to eat frequent meals.Follow up with your doctor on discharge         SECONDARY DISCHARGE DIAGNOSES  Diagnosis: Hypoglycemia  Assessment and Plan of Treatment: you were Noted with episodes of Low sugar, you were seen by an endocrinologist. Ensure that you eat 3-4 meals per day, recommend that you check your sugars  if you have any symptoms of Low sugar such as dizziness, sweating, Fatigue, blurry Vision etc  Contact your Primarycare Doctor or endocrinologist is your sugars are persistently Low ( 65-70)   please have your Primary Care doctor recheck your Thyroid levels in 6 weeks

## 2024-12-19 NOTE — PROGRESS NOTE ADULT - PROBLEM SELECTOR PLAN 2
Pt found to be hypoglycemic to 67 on admission   Possibly in setting of poor PO intake, pt states she only eats 1 meal a day. Improvement of glucose s/p juice. Pt states she has been off diabetes medications since gastric bypass surgery.  BS improving to 80-120s  Continue to monitor glucose  Hypoglycemia protocol  Encourage PO intake Pt found to be hypoglycemic to 67 on admission   Possibly in setting of poor PO intake, pt states she only eats 1 meal a day. Improvement of glucose s/p juice. Pt states she has been off diabetes medications since gastric bypass surgery.  BS improving to 80-120s  Continue to monitor glucose  Hypoglycemia protocol  Encourage PO intake- per family, pt doesn't eat much- drinks mostly coffee and eats only one small meal a day

## 2024-12-19 NOTE — PROGRESS NOTE ADULT - PROBLEM SELECTOR PLAN 1
Present with left-sided headache, hand cramping, dizziness, and rapid breathing, low BS to 64 on admission   Symptoms began to improve after 15 minutes, had additional episode which also resolved, but continued to experience left sided numbness, left head and neck pain  s/p stroke code in ED.    CT head unremarkable. CTA not performed due to contrast allergy.   MRI BRAIN: No acute infarction. MRA NECK: Unremarkable.  Dopplers LE- negative. UC- NGTD  Per neurology, low suspicion for TIA/stroke, symptoms likely due to hypoglycemia however recommend MR head, MRA head and neck.  No indication for antiplatelet therapy at this time  FU neurology recs, monitor glucose Present with left-sided headache, hand cramping, dizziness, and rapid breathing, low BS to 64 on admission   Symptoms began to improve after 15 minutes, had additional episode which also resolved, but continued to experience left sided numbness, left head and neck pain  s/p stroke code in ED.    CT head unremarkable. CTA not performed due to contrast allergy.   MRI BRAIN: No acute infarction. MRA NECK: Unremarkable.  Dopplers LE- negative. UC- NGTD  Per neurology, low suspicion for TIA/stroke, symptoms likely due to hypoglycemia however recommend MR head, MRA head and neck.  No indication for antiplatelet therapy at this time  DW neurology attending Dr Platt- No further intervention, can FU as outpt

## 2024-12-19 NOTE — DISCHARGE NOTE PROVIDER - PROVIDER TOKENS
PROVIDER:[TOKEN:[[id number]]] PROVIDER:[TOKEN:[[id number]]],PROVIDER:[TOKEN:[[id number]]]

## 2024-12-19 NOTE — DISCHARGE NOTE PROVIDER - NSDCFUSCHEDAPPT_GEN_ALL_CORE_FT
[facility]  DISPENSARY [address] [place] [address]   Scheduled Appointment: 01/02/2025    Kai Maldonado  [facility]  [address]THALM [address]  Scheduled Appointment: 01/08/2025    Willi Harp  [facility]  [address] [address]  Scheduled Appointment: 01/15/2025

## 2024-12-19 NOTE — PROGRESS NOTE ADULT - ASSESSMENT
79F PMHx HTN, HLD, hypothyroidism, DM (not on medications) who presented as code stroke due to left side numbness. Episode was associated with left-sided headache, hand cramping, dizziness  NEuro exam now non-focal  CTH-      transient symptoms in setting of hypoglycemia vs much less likely TIA    MRI Brain wnl  -No further inpatient Neurologic workup at this time

## 2024-12-19 NOTE — CONSULT NOTE ADULT - ASSESSMENT
A/P: This is a 79 year old woman with history of HTN, HLD, hypothyroidism who presented to the hospital for left upper extremity numbness. Stroke workup as thus far been negative.   Patient found to have BG in the 60s and was thought this was the reason for her paresthesias. Endocrinology consulted for further recommendations.     #Hypoglycemia  - BG noted to be in the 60s this admission- thought to be the cause of her initial paresthesias   - BG still remains in the 60s- ie this AM, patient ate breakfast and sugar afterwards was 67.   - Patient states that she was asymptomatic- she does not feel whipple's triad- symptoms of hypoglycemia, a low plasma glucose concentration when symptoms are present and resolution of symptoms after the plasma glucose level is raised.    - BG of 60s in someone in the absence of diabetes is not pathological   - No recent oral steroid use (does use steroid eyedrops), no use of insulin or sulfonylurea  - Patient also has a history of gastric bypass in 2015- After gastric bypass, there can be a mismatch between postprandial glucose disappearance and the offset of insulin action although may be less likely in this case in the absence of symptoms.   Plan:  - Encourage PO intake and keep hydrated   - Please order AM cortisol for 8am tomorrow morning   - Please order a sulfonylurea panel   - Should patient become hypoglycemic <65, or if patient has symptoms of hypoglycemia, PRIOR TO TREATING please draw the following STAT LABS to better assess the cause of hypoglycemia, so we can determine the best treatment (recommend ordering these labs to be activatable for when hypoglycemia occurs):  1) BMP  2) serum insulin  3) c-peptide  4) pro-insulin  5) Insulin antibodies  6) Beta hydroxybutyrate    #Hypothyroidism  - Patient states that she was previously on LT4 75mcg daily for many years   - She discontinued it on her own 2 months ago   - TSH now 4.74 which is reasonable for someone her age   Plan:  - can continue to hold levothyroxine for now     #Paresthesias- now resolved. Neurology following     Messaged primary team regarding plan     Doreen Emerson DO   Attending Physician   Department of Endocrinology, Diabetes and Metabolism     If before 9AM or after 5PM, or on weekends/holidays, please call the Endocrine answering service for assistance ([phone]).  For nonurgent matters, please email [email or web address] for assistance.     
Assessment: 79F PMHx HTN, HLD, hypothyroidism, DM (not on medications) who presented as code stroke due to left side numbness. Episode was associated with left-sided headache, hand cramping, dizziness, and rapid breathing. Symptoms began to improve after 15 minutes, had additional episode which also resolved, but continued to experience left sided numbness, left head and neck pain. Initial finger stick 64, received juice x2. Initial VS in ED: /82 Exam: Initially left face, arm, and leg numbness. Symptoms nearly completely improved after improvement of blood glucose.  CT head unremarkable. CTA not performed due to contrast allergy.      pre-mRS: 2  LKN: 12/17@2200  NIHSS: 4 (Bilateral LE drift L>R (chronic), sensory-> later improved)    Not a tenecteplase candidate due to mild/non-disabling symptoms.  Not a mechanical thrombectomy candidate due to symptoms not consistent with large vessel occlusion.    Impression: Left hemisensory deficit, likely in setting of hypoglycemia. Less likely stroke/TIA.    Recommendations:  [] Workup of hypoglycemia as per primary team  [] MRI brain and MRA head and neck  [] No indication for antiplatelet therapy at this time    Discussed with telestroke attending Dr. Martinez. Case and plan not final until Attending attestation.
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
## 2024-12-19 NOTE — DISCHARGE NOTE PROVIDER - CARE PROVIDER_API CALL
Chris Marks  Internal Medicine  [address] [place] [address]  Phone: [phone]  Fax: [phone]  Follow Up Time:    Chris Marks  Internal Medicine  [address] [place] [address]  Phone: [phone]  Fax: [phone]  Follow Up Time:     Tano Don)  Neurology  [address] [facility], [address]  [place] [address]  Phone: [phone]  Fax: [phone]  Follow Up Time:

## 2024-12-20 ENCOUNTER — TRANSCRIPTION ENCOUNTER (OUTPATIENT)
Age: 79
End: 2024-12-20

## 2024-12-20 VITALS
RESPIRATION RATE: 18 BRPM | HEART RATE: 77 BPM | DIASTOLIC BLOOD PRESSURE: 67 MMHG | OXYGEN SATURATION: 100 % | TEMPERATURE: 98 F | SYSTOLIC BLOOD PRESSURE: 132 MMHG

## 2024-12-20 LAB
ANION GAP SERPL CALC-SCNC: 8 MMOL/L — SIGNIFICANT CHANGE UP (ref 7–14)
BASOPHILS # BLD AUTO: 0.01 K/UL — SIGNIFICANT CHANGE UP (ref 0–0.2)
BASOPHILS NFR BLD AUTO: 0.3 % — SIGNIFICANT CHANGE UP (ref 0–2)
BUN SERPL-MCNC: 16 MG/DL — SIGNIFICANT CHANGE UP (ref 7–23)
CALCIUM SERPL-MCNC: 9.2 MG/DL — SIGNIFICANT CHANGE UP (ref 8.4–10.5)
CHLORIDE SERPL-SCNC: 106 MMOL/L — SIGNIFICANT CHANGE UP (ref 98–107)
CO2 SERPL-SCNC: 25 MMOL/L — SIGNIFICANT CHANGE UP (ref 22–31)
CORTIS AM PEAK SERPL-MCNC: 9.4 UG/DL — SIGNIFICANT CHANGE UP (ref 6–18.4)
CREAT SERPL-MCNC: 0.62 MG/DL — SIGNIFICANT CHANGE UP (ref 0.5–1.3)
EGFR: 91 ML/MIN/1.73M2 — SIGNIFICANT CHANGE UP
EOSINOPHIL # BLD AUTO: 0.06 K/UL — SIGNIFICANT CHANGE UP (ref 0–0.5)
EOSINOPHIL NFR BLD AUTO: 1.7 % — SIGNIFICANT CHANGE UP (ref 0–6)
GLUCOSE BLDC GLUCOMTR-MCNC: 135 MG/DL — HIGH (ref 70–99)
GLUCOSE BLDC GLUCOMTR-MCNC: 70 MG/DL — SIGNIFICANT CHANGE UP (ref 70–99)
GLUCOSE BLDC GLUCOMTR-MCNC: 98 MG/DL — SIGNIFICANT CHANGE UP (ref 70–99)
GLUCOSE SERPL-MCNC: 86 MG/DL — SIGNIFICANT CHANGE UP (ref 70–99)
HCT VFR BLD CALC: 37.9 % — SIGNIFICANT CHANGE UP (ref 34.5–45)
HGB BLD-MCNC: 11.9 G/DL — SIGNIFICANT CHANGE UP (ref 11.5–15.5)
IANC: 1.32 K/UL — LOW (ref 1.8–7.4)
IMM GRANULOCYTES NFR BLD AUTO: 0.3 % — SIGNIFICANT CHANGE UP (ref 0–0.9)
LYMPHOCYTES # BLD AUTO: 1.78 K/UL — SIGNIFICANT CHANGE UP (ref 1–3.3)
LYMPHOCYTES # BLD AUTO: 51.6 % — HIGH (ref 13–44)
MAGNESIUM SERPL-MCNC: 2.2 MG/DL — SIGNIFICANT CHANGE UP (ref 1.6–2.6)
MCHC RBC-ENTMCNC: 30.8 PG — SIGNIFICANT CHANGE UP (ref 27–34)
MCHC RBC-ENTMCNC: 31.4 G/DL — LOW (ref 32–36)
MCV RBC AUTO: 98.2 FL — SIGNIFICANT CHANGE UP (ref 80–100)
MONOCYTES # BLD AUTO: 0.27 K/UL — SIGNIFICANT CHANGE UP (ref 0–0.9)
MONOCYTES NFR BLD AUTO: 7.8 % — SIGNIFICANT CHANGE UP (ref 2–14)
NEUTROPHILS # BLD AUTO: 1.32 K/UL — LOW (ref 1.8–7.4)
NEUTROPHILS NFR BLD AUTO: 38.3 % — LOW (ref 43–77)
NRBC # BLD: 0 /100 WBCS — SIGNIFICANT CHANGE UP (ref 0–0)
NRBC # FLD: 0 K/UL — SIGNIFICANT CHANGE UP (ref 0–0)
PHOSPHATE SERPL-MCNC: 3.5 MG/DL — SIGNIFICANT CHANGE UP (ref 2.5–4.5)
PLATELET # BLD AUTO: 224 K/UL — SIGNIFICANT CHANGE UP (ref 150–400)
POTASSIUM SERPL-MCNC: 4.9 MMOL/L — SIGNIFICANT CHANGE UP (ref 3.5–5.3)
POTASSIUM SERPL-SCNC: 4.9 MMOL/L — SIGNIFICANT CHANGE UP (ref 3.5–5.3)
RBC # BLD: 3.86 M/UL — SIGNIFICANT CHANGE UP (ref 3.8–5.2)
RBC # FLD: 13.7 % — SIGNIFICANT CHANGE UP (ref 10.3–14.5)
SODIUM SERPL-SCNC: 139 MMOL/L — SIGNIFICANT CHANGE UP (ref 135–145)
WBC # BLD: 3.45 K/UL — LOW (ref 3.8–10.5)
WBC # FLD AUTO: 3.45 K/UL — LOW (ref 3.8–10.5)

## 2024-12-20 PROCEDURE — 99233 SBSQ HOSP IP/OBS HIGH 50: CPT

## 2024-12-20 PROCEDURE — 99239 HOSP IP/OBS DSCHRG MGMT >30: CPT

## 2024-12-20 RX ADMIN — ENOXAPARIN SODIUM 40 MILLIGRAM(S): 30 INJECTION SUBCUTANEOUS at 13:22

## 2024-12-20 RX ADMIN — PREDNISOLONE ACETATE 1 DROP(S): 1.2 SUSPENSION/ DROPS OPHTHALMIC at 05:00

## 2024-12-20 RX ADMIN — PREDNISOLONE ACETATE 1 DROP(S): 1.2 SUSPENSION/ DROPS OPHTHALMIC at 17:18

## 2024-12-20 NOTE — PROGRESS NOTE ADULT - PROBLEM SELECTOR PLAN 2
Pt found to be hypoglycemic to 67 on admission   Possibly in setting of poor PO intake, pt states she only eats 1 meal a day. Improvement of glucose s/p juice. Pt states she has been off diabetes medications since gastric bypass surgery.  BS improving to 80-120s  Continue to monitor glucose  Hypoglycemia protocol  Encourage PO intake- per family, pt doesn't eat much- drinks mostly coffee and eats only one small meal a day  Seen by Endo- Advise to check AM cortisol, serum insulin, c-peptide, pro-insulin, Insulin antibodies, Beta hydroxybutyrate  FU results Pt found to be hypoglycemic to 67 on admission   Possibly in setting of poor PO intake, pt states she only eats 1 meal a day. Improvement of glucose s/p juice. Pt states she has been off diabetes medications since gastric bypass surgery.  BS improving to 80-120s  Continue to monitor glucose  Hypoglycemia protocol  Encourage PO intake- per family, pt doesn't eat much- drinks mostly coffee and eats only one small meal a day  Seen by Endo-"Pt did not  feel whipple's triad- symptoms of hypoglycemia, a low plasma glucose concentration when symptoms are present and resolution of symptoms after the plasma glucose level is raised.  BG of 60s in someone in the absence of diabetes is not pathological. No recent oral steroid use (does use steroid eyedrops), no use of insulin or sulfonylurea. Advise to check AM cortisol, serum insulin, c-peptide, pro-insulin, Insulin antibodies, Beta hydroxybutyrate should BS < 65  or if patient has symptoms of hypoglycemia,  Am cortisol- 9.4, AM BS was 70  Will discuss results with Endo  pt advised to eat regular, frequent meals. Educated on symptoms of low blood sugar

## 2024-12-20 NOTE — DISCHARGE NOTE NURSING/CASE MANAGEMENT/SOCIAL WORK - PATIENT PORTAL LINK FT
You can access the FollowMyHealth Patient Portal offered by [facility] by registering at the following website: [email or web address]. By joining [facility]’s FollowMyHealth portal, you will also be able to view your health information using other applications (apps) compatible with our system.

## 2024-12-20 NOTE — PROGRESS NOTE ADULT - TIME BILLING
Analyzing and interpreting data, physical assessment and evaluation of patient, discussion plan with family, coordination of care

## 2024-12-20 NOTE — PROGRESS NOTE ADULT - SUBJECTIVE AND OBJECTIVE BOX
Briefly,     Interval events: No acute events overnight      PAST MEDICAL & SURGICAL HISTORY:  HLD (hyperlipidemia)      Hypothyroid      GERD (gastroesophageal reflux disease)      History of cholecystectomy      H/O gastric bypass          REVIEW OF SYSTEMS:  CONSTITUTIONAL:  Feels well, good appetite  CARDIOVASCULAR:  Negative for chest pain or palpitations  RESPIRATORY:  Negative for cough, or SOB   GASTROINTESTINAL:  Negative for nausea, vomiting, or abdominal pain  GENITOURINARY:  Negative frequency, urgency or dysuria          angiotensin II inhibitors (Hives)  contrast media (iodine-based) (Rash)  Ancef (Unknown)  Bananas (Unknown)  Diovan (Hives)  angiotensin converting enzyme inhibitors (Unknown)  eggs (Hives; Urticaria; Rash)    Home Medications:     MEDICATIONS  (STANDING):  dextrose 5%. 1000 milliLiter(s) (50 mL/Hr) IV Continuous <Continuous>  dextrose 5%. 1000 milliLiter(s) (100 mL/Hr) IV Continuous <Continuous>  dextrose 50% Injectable 25 Gram(s) IV Push once  dextrose 50% Injectable 12.5 Gram(s) IV Push once  dextrose 50% Injectable 25 Gram(s) IV Push once  enoxaparin Injectable 40 milliGRAM(s) SubCutaneous every 24 hours  glucagon  Injectable 1 milliGRAM(s) IntraMuscular once  influenza  Vaccine (HIGH DOSE) 0.5 milliLiter(s) IntraMuscular once  polyethylene glycol 3350 17 Gram(s) Oral daily  prednisoLONE acetate 1% Suspension 1 Drop(s) Left EYE two times a day    MEDICATIONS  (PRN):  dextrose Oral Gel 15 Gram(s) Oral once PRN Blood Glucose LESS THAN 70 milliGRAM(s)/deciliter        OBJECTIVE:     Vital Signs Last 24 Hrs  T(C): 36.9 (20 Dec 2024 08:00), Max: 36.9 (19 Dec 2024 12:00)  T(F): 98.4 (20 Dec 2024 08:00), Max: 98.5 (19 Dec 2024 12:00)  HR: 63 (20 Dec 2024 08:00) (60 - 71)  BP: 115/64 (20 Dec 2024 08:00) (115/64 - 148/64)  BP(mean): --  RR: 17 (20 Dec 2024 08:00) (17 - 18)  SpO2: 100% (20 Dec 2024 08:00) (98% - 100%)    Parameters below as of 20 Dec 2024 08:00  Patient On (Oxygen Delivery Method): room air        PHYSICAL EXAM:  GENERAL:  laying in bed in NAD  RESPIRATORY: nonlabored breathing, no accessory muscle use  Extremities: Warm, no edema in all 4 exts   NEURO: A&O X3      I&O's Detail      LABS:                        11.9   3.45  )-----------( 224      ( 20 Dec 2024 04:57 )             37.9     Hgb Trend: 11.9<--, 11.0<--, 11.7<--  12-20    139  |  106  |  16  ----------------------------<  86  4.9   |  25  |  0.62    Ca    9.2      20 Dec 2024 04:57  Phos  3.5     12-20  Mg     2.20     12-20      Creatinine Trend: 0.62<--, 0.63<--, 0.78<--    Urinalysis Basic - ( 20 Dec 2024 04:57 )    Color: x / Appearance: x / SG: x / pH: x  Gluc: 86 mg/dL / Ketone: x  / Bili: x / Urobili: x   Blood: x / Protein: x / Nitrite: x   Leuk Esterase: x / RBC: x / WBC x   Sq Epi: x / Non Sq Epi: x / Bacteria: x

## 2024-12-20 NOTE — DISCHARGE NOTE NURSING/CASE MANAGEMENT/SOCIAL WORK - FINANCIAL ASSISTANCE
[facility] provides services at a reduced cost to those who are determined to be eligible through [facility]’s financial assistance program. Information regarding [facility]’s financial assistance program can be found by going to [email or web address] or by calling [phone].

## 2024-12-20 NOTE — PROGRESS NOTE ADULT - PROBLEM SELECTOR PLAN 3
Pt states she has been off synthroid x 1 months. She states she self d/meredith because she did not want to be taking daily medications. TSH mildly elevated at 4.75, free T4 and T3 wnl.  Continue to monitor  Advise FU as outpt

## 2024-12-20 NOTE — PROGRESS NOTE ADULT - PROBLEM SELECTOR PLAN 5
DVT prophylaxis: lovenox  Diet: dash/tlc  PT- Anticipated no skilled PT needs  DC planning 34 mins pending resolution and work up of Hypoglycemia DVT prophylaxis: lovenox  Diet: dash/tlc  PT- Anticipated no skilled PT needs  DC planning 34 mins pending endo clearance

## 2024-12-20 NOTE — PROGRESS NOTE ADULT - ASSESSMENT
A/P: This is a 79 year old woman with history of HTN, HLD, hypothyroidism who presented to the hospital for left upper extremity numbness. Stroke workup as thus far been negative.   Patient found to have BG in the 60s and was thought this was the reason for her paresthesias. Endocrinology consulted for further recommendations.     #Hypoglycemia  - BG noted to be in the 60s this admission- thought to be the cause of her initial paresthesias   - Patient states that she was asymptomatic- she does not feel whipple's triad- symptoms of hypoglycemia, a low plasma glucose concentration when symptoms are present and resolution of symptoms after the plasma glucose level is raised.    - BG of 60s in someone in the absence of diabetes is not pathological   - No recent oral steroid use (does use steroid eyedrops), no use of insulin or sulfonylurea  - Patient also has a history of gastric bypass in 2015- After gastric bypass, there can be a mismatch between postprandial glucose disappearance and the offset of insulin action although may be less likely in this case in the absence of symptoms.   Plan:  - Encourage PO intake and keep hydrated   - Please order AM cortisol for 8am tomorrow morning   - Please order a sulfonylurea panel   - Should patient become hypoglycemic <55, PRIOR TO TREATING please draw the following STAT LABS to better assess the cause of hypoglycemia, so we can determine the best treatment (recommend ordering these labs to be activatable for when hypoglycemia occurs):  1) BMP  2) serum insulin  3) c-peptide  4) pro-insulin  5) Insulin antibodies  6) Beta hydroxybutyrate    #Hypothyroidism  - Patient states that she was previously on LT4 75mcg daily for many years  - She discontinued it on her own 2 months ago   - TSH now 4.74 which is reasonable for someone her age   Plan:  - can continue to hold levothyroxine for now and repeat TSH outpatient with PCP in 6 weeks          ePtros Harris MD  Attending Physician   Department of Endocrinology, Diabetes and Metabolism     If before 9AM or after 5PM, or on weekends/holidays, please call the Endocrine answering service for assistance ([phone]).  For nonurgent matters, please email [email or web address] for assistance.  A/P: This is a 79 year old woman with history of HTN, HLD, hypothyroidism who presented to the hospital for left upper extremity numbness. Stroke workup as thus far been negative.   Patient found to have BG in the 60s and was thought this was the reason for her paresthesias. Endocrinology consulted for further recommendations.     #Hypoglycemia  - BG noted to be in the 60s this admission- thought to be the cause of her initial paresthesias   - Patient states that she was asymptomatic- she does not feel whipple's triad- symptoms of hypoglycemia, a low plasma glucose concentration when symptoms are present and resolution of symptoms after the plasma glucose level is raised.    - BG of 60s in someone in the absence of diabetes is not pathological   - No recent oral steroid use (does use steroid eyedrops), no use of insulin or sulfonylurea  - Patient also has a history of gastric bypass in 2015- After gastric bypass, there can be a mismatch between postprandial glucose disappearance and the offset of insulin action although may be less likely in this case in the absence of symptoms.   - AM cortisol 9.4- adrenal insufficiency unlikely in setting of stable vitals and no clinical sx    Plan:  - Encourage PO intake and keep hydrated   - Please order AM cortisol for 8am tomorrow morning   - Please order a sulfonylurea panel   - Should patient become hypoglycemic <55, PRIOR TO TREATING please draw the following STAT LABS to better assess the cause of hypoglycemia, so we can determine the best treatment (recommend ordering these labs to be activatable for when hypoglycemia occurs):  1) BMP  2) serum insulin  3) c-peptide  4) pro-insulin  5) Insulin antibodies  6) Beta hydroxybutyrate    #Hypothyroidism  - Patient states that she was previously on LT4 75mcg daily for many years  - She discontinued it on her own 2 months ago   - TSH now 4.74 which is reasonable for someone her age   Plan:  - can continue to hold levothyroxine for now and repeat TSH outpatient with PCP in 6 weeks          Petros Harris MD  Attending Physician   Department of Endocrinology, Diabetes and Metabolism     If before 9AM or after 5PM, or on weekends/holidays, please call the Endocrine answering service for assistance ([phone]).  For nonurgent matters, please email [email or web address] for assistance.

## 2024-12-20 NOTE — PROGRESS NOTE ADULT - PROBLEM SELECTOR PLAN 1
Present with left-sided headache, hand cramping, dizziness, and rapid breathing, low BS to 64 on admission   Symptoms began to improve after 15 minutes, had additional episode which also resolved, but continued to experience left sided numbness, left head and neck pain  s/p stroke code in ED.    CT head unremarkable. CTA not performed due to contrast allergy.   MRI BRAIN: No acute infarction. MRA NECK: Unremarkable.  Dopplers LE- negative. UC- NGTD  Per neurology, low suspicion for TIA/stroke, symptoms likely due to hypoglycemia however recommend MR head, MRA head and neck.  No indication for antiplatelet therapy at this time  DW neurology attending Dr Platt- No further intervention, can FU as outpt

## 2024-12-20 NOTE — PROGRESS NOTE ADULT - SUBJECTIVE AND OBJECTIVE BOX
Patient seen and examined this am. No new events    MEDICATIONS:    dextrose 5%. 1000 milliLiter(s) IV Continuous <Continuous>  dextrose 5%. 1000 milliLiter(s) IV Continuous <Continuous>  dextrose 50% Injectable 25 Gram(s) IV Push once  dextrose 50% Injectable 12.5 Gram(s) IV Push once  dextrose 50% Injectable 25 Gram(s) IV Push once  dextrose Oral Gel 15 Gram(s) Oral once PRN  enoxaparin Injectable 40 milliGRAM(s) SubCutaneous every 24 hours  glucagon  Injectable 1 milliGRAM(s) IntraMuscular once  influenza  Vaccine (HIGH DOSE) 0.5 milliLiter(s) IntraMuscular once  polyethylene glycol 3350 17 Gram(s) Oral daily  prednisoLONE acetate 1% Suspension 1 Drop(s) Left EYE two times a day      LABS:                          11.9   3.45  )-----------( 224      ( 20 Dec 2024 04:57 )             37.9     12-20    139  |  106  |  16  ----------------------------<  86  4.9   |  25  |  0.62    Ca    9.2      20 Dec 2024 04:57  Phos  3.5     12-20  Mg     2.20     12-20      CAPILLARY BLOOD GLUCOSE      POCT Blood Glucose.: 135 mg/dL (20 Dec 2024 09:22)  POCT Blood Glucose.: 70 mg/dL (20 Dec 2024 08:36)  POCT Blood Glucose.: 128 mg/dL (19 Dec 2024 20:18)  POCT Blood Glucose.: 71 mg/dL (19 Dec 2024 18:13)  POCT Blood Glucose.: 158 mg/dL (19 Dec 2024 12:51)  POCT Blood Glucose.: 68 mg/dL (19 Dec 2024 12:18)  POCT Blood Glucose.: 67 mg/dL (19 Dec 2024 12:15)      Urinalysis Basic - ( 20 Dec 2024 04:57 )    Color: x / Appearance: x / SG: x / pH: x  Gluc: 86 mg/dL / Ketone: x  / Bili: x / Urobili: x   Blood: x / Protein: x / Nitrite: x   Leuk Esterase: x / RBC: x / WBC x   Sq Epi: x / Non Sq Epi: x / Bacteria: x      I&O's Summary    Vital Signs Last 24 Hrs  T(C): 36.9 (20 Dec 2024 08:00), Max: 36.9 (19 Dec 2024 12:00)  T(F): 98.4 (20 Dec 2024 08:00), Max: 98.5 (19 Dec 2024 12:00)  HR: 63 (20 Dec 2024 08:00) (60 - 71)  BP: 115/64 (20 Dec 2024 08:00) (115/64 - 148/64)  BP(mean): --  RR: 17 (20 Dec 2024 08:00) (17 - 18)  SpO2: 100% (20 Dec 2024 08:00) (98% - 100%)    Parameters below as of 20 Dec 2024 08:00  Patient On (Oxygen Delivery Method): room air      Neuro: AAOx3; knows age, month, obeys commands, no dysarthria; calculations intact, fluent names, repeats     CN: PERRL, EOMI, VFF normal gaze preference, no facial palsy,     Motor: no drift in all extremeties    Sensory: Intact to LT and PP no extinction    Coordination: FTN intact b/l          < from: MR Head w/wo IV Cont (12.18.24 @ 21:46) >  IMPRESSION:    MRI BRAIN: No acute infarction.    MRA NECK: Unremarkable.    --- End of Report ---      < end of copied text >

## 2024-12-20 NOTE — PROGRESS NOTE ADULT - ASSESSMENT
79F PMHx HTN, HLD, hypothyroidism, DM (not on medications) who presented as code stroke due to left side numbness. Episode was associated with left-sided headache, hand cramping, dizziness  NEuro exam now non-focal  CTH-      transient symptoms in setting of hypoglycemia with quick return to BL with glucose intake vs much less likely TIA    MRI Brain wnl  -No further inpatient Neurologic workup at this time

## 2024-12-20 NOTE — PROGRESS NOTE ADULT - SUBJECTIVE AND OBJECTIVE BOX
Madonna Peraza  Hospitalist  Pager- [phone]      PROGRESS NOTE:     Patient is a 79y old  Female who presents with a chief complaint of paresthesias (18 Dec 2024 03:24)      SUBJECTIVE / OVERNIGHT EVENTS: Pt seen and examined by me. Grandchildren at  bedside  Tingling resolved. No new symptoms  However BS dropped to 67s yesterday, improved to 128 but dropped again to 70 this AM   Encouraged PO- per family, pt doesn't eat much- drinks mostly coffee and eats only one small meal a day        MEDICATIONS  (STANDING):  dextrose 5%. 1000 milliLiter(s) (50 mL/Hr) IV Continuous <Continuous>  dextrose 5%. 1000 milliLiter(s) (100 mL/Hr) IV Continuous <Continuous>  dextrose 50% Injectable 25 Gram(s) IV Push once  dextrose 50% Injectable 12.5 Gram(s) IV Push once  dextrose 50% Injectable 25 Gram(s) IV Push once  enoxaparin Injectable 40 milliGRAM(s) SubCutaneous every 24 hours  glucagon  Injectable 1 milliGRAM(s) IntraMuscular once  influenza  Vaccine (HIGH DOSE) 0.5 milliLiter(s) IntraMuscular once  polyethylene glycol 3350 17 Gram(s) Oral daily  prednisoLONE acetate 1% Suspension 1 Drop(s) Left EYE two times a day    MEDICATIONS  (PRN):  dextrose Oral Gel 15 Gram(s) Oral once PRN Blood Glucose LESS THAN 70 milliGRAM(s)/deciliter      CAPILLARY BLOOD GLUCOSE  POCT Blood Glucose.: 135 mg/dL (20 Dec 2024 09:22)  I&O's Summary      PHYSICAL EXAM:      Vital Signs Last 24 Hrs  T(C): 36.9 (20 Dec 2024 08:00), Max: 36.9 (19 Dec 2024 12:00)  T(F): 98.4 (20 Dec 2024 08:00), Max: 98.5 (19 Dec 2024 12:00)  HR: 63 (20 Dec 2024 08:00) (60 - 71)  BP: 115/64 (20 Dec 2024 08:00) (115/64 - 148/64)  BP(mean): --  RR: 17 (20 Dec 2024 08:00) (17 - 18)  SpO2: 100% (20 Dec 2024 08:00) (98% - 100%)    Parameters below as of 20 Dec 2024 08:00  Patient On (Oxygen Delivery Method): room air          CONSTITUTIONAL: NAD, well-developed  RESPIRATORY: Normal respiratory effort; lungs are clear to auscultation bilaterally  CARDIOVASCULAR: Regular rate and rhythm, normal S1 and S2, no murmur/rub/gallop; No lower extremity edema; Peripheral pulses are 2+ bilaterally  ABDOMEN: Nontender to palpation, normoactive bowel sounds, no rebound/guarding; No hepatosplenomegaly  MUSCLOSKELETAL: no clubbing or cyanosis of digits; no joint swelling or tenderness to palpation  PSYCH: A+O to person, place, and time; affect appropriate  NEURO: AAOx 3, speech fluent, ? mild right facial droop present. Strength- 5/5  SKIN: No rash   Left thigh lipoma present     LABS:                                             11.9   3.45  )-----------( 224      ( 20 Dec 2024 04:57 )             37.9         12-20    139  |  106  |  16  ----------------------------<  86  4.9   |  25  |  0.62    Ca    9.2      20 Dec 2024 04:57  Phos  3.5     12-20  Mg     2.20     12-20      Urinalysis Basic - ( 17 Dec 2024 23:51 )    Color: Yellow / Appearance: Clear / S.012 / pH: x  Gluc: x / Ketone: Negative mg/dL  / Bili: Negative / Urobili: 1.0 mg/dL   Blood: x / Protein: Negative mg/dL / Nitrite: Negative   Leuk Esterase: Moderate / RBC: 0 /HPF / WBC 8 /HPF   Sq Epi: x / Non Sq Epi: 4 /HPF / Bacteria: Negative /HPF        Urinalysis with Rflx Culture (collected 17 Dec 2024 23:51)        RADIOLOGY & ADDITIONAL TESTS:  Results Reviewed:   Imaging Personally Reviewed:  Electrocardiogram Personally Reviewed:    COORDINATION OF CARE:  Care Discussed with Consultants/Other Providers [Y/N]:  Prior or Outpatient Records Reviewed [Y/N]:

## 2024-12-20 NOTE — CHART NOTE - NSCHARTNOTEFT_GEN_A_CORE
Case discussed with Endocrine, pt is stable to DC Home with Glucose monitoring supplies
Notified by RN that [t has a FS of 67 Despite eating a Full Breakfast and lunch, Pt was given Dextrose 50% , repeat   Above was discussed with attending, as per her recommendations, will Hold Discharge and Call Endocrine C/s.

## 2024-12-20 NOTE — PROGRESS NOTE ADULT - PROBLEM SELECTOR PLAN 4
Pt states her PCP d/meredith BP meds, previously on amlodipine, losartan  SBP in 120s  Continue to monitor BPs

## 2024-12-31 LAB
CHLORPROPAMIDE RESULT: SIGNIFICANT CHANGE UP MCG/ML
GLIMEPIRIDE RESULT: SIGNIFICANT CHANGE UP NG/ML
GLIPIZIDE RESULT: SIGNIFICANT CHANGE UP NG/ML
GLYBURIDE RESULT: SIGNIFICANT CHANGE UP NG/ML
NATEGLINIDE RESULT: SIGNIFICANT CHANGE UP MCG/ML
PIOGLITAZONE RESULT: SIGNIFICANT CHANGE UP NG/ML
REPAGLINIDE RESULT: SIGNIFICANT CHANGE UP NG/ML
ROSIGLITAZONE RESULT: SIGNIFICANT CHANGE UP NG/ML
TOLAZAMIDE RESULT: SIGNIFICANT CHANGE UP MCG/ML
TOLBUTAMIDE RESULT: SIGNIFICANT CHANGE UP MCG/ML

## 2025-01-02 ENCOUNTER — APPOINTMENT (OUTPATIENT)
Dept: PHARMACY | Facility: CLINIC | Age: 80
End: 2025-01-02
Payer: SELF-PAY

## 2025-01-02 PROCEDURE — V5299A: CUSTOM

## 2025-01-08 ENCOUNTER — APPOINTMENT (OUTPATIENT)
Dept: OPHTHALMOLOGY | Facility: CLINIC | Age: 80
End: 2025-01-08
Payer: MEDICARE

## 2025-01-08 ENCOUNTER — NON-APPOINTMENT (OUTPATIENT)
Age: 80
End: 2025-01-08

## 2025-01-08 PROCEDURE — 92012 INTRM OPH EXAM EST PATIENT: CPT

## 2025-01-15 ENCOUNTER — NON-APPOINTMENT (OUTPATIENT)
Age: 80
End: 2025-01-15

## 2025-01-15 ENCOUNTER — APPOINTMENT (OUTPATIENT)
Dept: OPHTHALMOLOGY | Facility: CLINIC | Age: 80
End: 2025-01-15
Payer: MEDICARE

## 2025-01-15 PROCEDURE — 92134 CPTRZ OPH DX IMG PST SGM RTA: CPT

## 2025-01-15 PROCEDURE — 92014 COMPRE OPH EXAM EST PT 1/>: CPT

## 2025-01-15 PROCEDURE — 92201 OPSCPY EXTND RTA DRAW UNI/BI: CPT

## 2025-01-23 ENCOUNTER — APPOINTMENT (OUTPATIENT)
Dept: PHARMACY | Facility: CLINIC | Age: 80
End: 2025-01-23
Payer: SELF-PAY

## 2025-01-23 PROCEDURE — V5299A: CUSTOM

## 2025-02-03 ENCOUNTER — APPOINTMENT (OUTPATIENT)
Dept: OTOLARYNGOLOGY | Facility: CLINIC | Age: 80
End: 2025-02-03
Payer: MEDICARE

## 2025-02-03 VITALS
HEART RATE: 63 BPM | HEIGHT: 64 IN | DIASTOLIC BLOOD PRESSURE: 80 MMHG | SYSTOLIC BLOOD PRESSURE: 135 MMHG | BODY MASS INDEX: 18.78 KG/M2 | OXYGEN SATURATION: 97 % | WEIGHT: 110 LBS

## 2025-02-03 PROCEDURE — 92567 TYMPANOMETRY: CPT

## 2025-02-03 PROCEDURE — 92557 COMPREHENSIVE HEARING TEST: CPT

## 2025-02-03 PROCEDURE — 99213 OFFICE O/P EST LOW 20 MIN: CPT

## 2025-02-13 ENCOUNTER — APPOINTMENT (OUTPATIENT)
Dept: PHARMACY | Facility: CLINIC | Age: 80
End: 2025-02-13
Payer: SELF-PAY

## 2025-02-13 PROCEDURE — V5299A: CUSTOM

## 2025-04-07 ENCOUNTER — NON-APPOINTMENT (OUTPATIENT)
Age: 80
End: 2025-04-07

## 2025-04-07 ENCOUNTER — APPOINTMENT (OUTPATIENT)
Dept: OPHTHALMOLOGY | Facility: CLINIC | Age: 80
End: 2025-04-07
Payer: MEDICARE

## 2025-04-07 PROCEDURE — 92083 EXTENDED VISUAL FIELD XM: CPT

## 2025-04-07 PROCEDURE — 99214 OFFICE O/P EST MOD 30 MIN: CPT

## 2025-07-08 ENCOUNTER — NON-APPOINTMENT (OUTPATIENT)
Age: 80
End: 2025-07-08

## 2025-07-10 ENCOUNTER — APPOINTMENT (OUTPATIENT)
Dept: PHARMACY | Facility: CLINIC | Age: 80
End: 2025-07-10

## 2025-07-10 PROCEDURE — V5014D: CUSTOM

## 2025-07-16 ENCOUNTER — NON-APPOINTMENT (OUTPATIENT)
Age: 80
End: 2025-07-16

## 2025-07-16 ENCOUNTER — APPOINTMENT (OUTPATIENT)
Dept: OPHTHALMOLOGY | Facility: CLINIC | Age: 80
End: 2025-07-16
Payer: MEDICARE

## 2025-07-16 PROCEDURE — 92286 ANT SGM IMG I&R SPECLR MIC: CPT

## 2025-07-16 PROCEDURE — 92012 INTRM OPH EXAM EST PATIENT: CPT

## 2025-07-18 ENCOUNTER — APPOINTMENT (OUTPATIENT)
Dept: PHARMACY | Facility: CLINIC | Age: 80
End: 2025-07-18
Payer: SELF-PAY

## 2025-07-18 PROCEDURE — V5014D: CUSTOM

## 2025-07-23 ENCOUNTER — NON-APPOINTMENT (OUTPATIENT)
Age: 80
End: 2025-07-23

## 2025-07-23 ENCOUNTER — APPOINTMENT (OUTPATIENT)
Dept: OPHTHALMOLOGY | Facility: CLINIC | Age: 80
End: 2025-07-23
Payer: MEDICARE

## 2025-07-23 PROCEDURE — 92014 COMPRE OPH EXAM EST PT 1/>: CPT

## 2025-07-23 PROCEDURE — 92134 CPTRZ OPH DX IMG PST SGM RTA: CPT

## 2025-08-14 ENCOUNTER — APPOINTMENT (OUTPATIENT)
Dept: OPHTHALMOLOGY | Facility: CLINIC | Age: 80
End: 2025-08-14
Payer: MEDICARE

## 2025-08-14 ENCOUNTER — NON-APPOINTMENT (OUTPATIENT)
Age: 80
End: 2025-08-14

## 2025-08-14 PROCEDURE — 92083 EXTENDED VISUAL FIELD XM: CPT

## 2025-08-14 PROCEDURE — 92012 INTRM OPH EXAM EST PATIENT: CPT

## 2025-08-14 PROCEDURE — 92133 CPTRZD OPH DX IMG PST SGM ON: CPT

## 2025-08-28 ENCOUNTER — RESULT REVIEW (OUTPATIENT)
Age: 80
End: 2025-08-28

## 2025-08-28 ENCOUNTER — APPOINTMENT (OUTPATIENT)
Dept: OPHTHALMOLOGY | Facility: AMBULATORY SURGERY CENTER | Age: 80
End: 2025-08-28

## 2025-08-28 ENCOUNTER — OUTPATIENT (OUTPATIENT)
Dept: OUTPATIENT SERVICES | Facility: HOSPITAL | Age: 80
LOS: 1 days | Discharge: HOME | End: 2025-08-28
Payer: MEDICARE

## 2025-08-28 VITALS
DIASTOLIC BLOOD PRESSURE: 50 MMHG | SYSTOLIC BLOOD PRESSURE: 128 MMHG | TEMPERATURE: 98 F | HEIGHT: 63 IN | OXYGEN SATURATION: 99 % | WEIGHT: 195.55 LBS | HEART RATE: 60 BPM | RESPIRATION RATE: 16 BRPM

## 2025-08-28 VITALS
RESPIRATION RATE: 16 BRPM | HEART RATE: 67 BPM | TEMPERATURE: 98 F | DIASTOLIC BLOOD PRESSURE: 60 MMHG | OXYGEN SATURATION: 98 % | SYSTOLIC BLOOD PRESSURE: 134 MMHG

## 2025-08-28 DIAGNOSIS — Z90.49 ACQUIRED ABSENCE OF OTHER SPECIFIED PARTS OF DIGESTIVE TRACT: Chronic | ICD-10-CM

## 2025-08-28 DIAGNOSIS — Z98.890 OTHER SPECIFIED POSTPROCEDURAL STATES: Chronic | ICD-10-CM

## 2025-08-28 DIAGNOSIS — Z98.84 BARIATRIC SURGERY STATUS: Chronic | ICD-10-CM

## 2025-08-28 DIAGNOSIS — Z86.69 PERSONAL HISTORY OF OTHER DISEASES OF THE NERVOUS SYSTEM AND SENSE ORGANS: Chronic | ICD-10-CM

## 2025-08-28 DIAGNOSIS — Z98.49 CATARACT EXTRACTION STATUS, UNSPECIFIED EYE: Chronic | ICD-10-CM

## 2025-08-28 PROCEDURE — 65756 CORNEAL TRNSPL ENDOTHELIAL: CPT | Mod: LT

## 2025-08-28 PROCEDURE — 88304 TISSUE EXAM BY PATHOLOGIST: CPT | Mod: 26

## 2025-08-28 DEVICE — GAS IOL HEXAFLUORIDE CYL: Type: IMPLANTABLE DEVICE | Site: LEFT | Status: FUNCTIONAL

## 2025-08-28 RX ORDER — OFLOXACIN 3 MG/ML
1 SOLUTION OPHTHALMIC
Refills: 0 | Status: COMPLETED | OUTPATIENT
Start: 2025-08-28 | End: 2025-08-28

## 2025-08-28 RX ORDER — SODIUM CHLORIDE 9 G/1000ML
500 INJECTION, SOLUTION INTRAVENOUS
Refills: 0 | Status: DISCONTINUED | OUTPATIENT
Start: 2025-08-28 | End: 2025-08-28

## 2025-08-28 RX ORDER — ACETAMINOPHEN 500 MG/5ML
650 LIQUID (ML) ORAL ONCE
Refills: 0 | Status: DISCONTINUED | OUTPATIENT
Start: 2025-08-28 | End: 2025-08-28

## 2025-08-28 RX ADMIN — OFLOXACIN 1 DROP(S): 3 SOLUTION OPHTHALMIC at 11:40

## 2025-08-28 RX ADMIN — OFLOXACIN 1 DROP(S): 3 SOLUTION OPHTHALMIC at 11:35

## 2025-08-28 RX ADMIN — OFLOXACIN 1 DROP(S): 3 SOLUTION OPHTHALMIC at 11:30

## 2025-08-29 ENCOUNTER — NON-APPOINTMENT (OUTPATIENT)
Age: 80
End: 2025-08-29

## 2025-08-29 ENCOUNTER — APPOINTMENT (OUTPATIENT)
Dept: OPHTHALMOLOGY | Facility: CLINIC | Age: 80
End: 2025-08-29
Payer: MEDICARE

## 2025-08-29 PROCEDURE — 99024 POSTOP FOLLOW-UP VISIT: CPT

## 2025-09-04 ENCOUNTER — APPOINTMENT (OUTPATIENT)
Dept: OPHTHALMOLOGY | Facility: CLINIC | Age: 80
End: 2025-09-04

## 2025-09-04 ENCOUNTER — NON-APPOINTMENT (OUTPATIENT)
Age: 80
End: 2025-09-04

## 2025-09-04 PROBLEM — I10 ESSENTIAL (PRIMARY) HYPERTENSION: Chronic | Status: ACTIVE | Noted: 2025-08-28

## 2025-09-04 PROBLEM — E11.9 TYPE 2 DIABETES MELLITUS WITHOUT COMPLICATIONS: Chronic | Status: ACTIVE | Noted: 2025-08-28

## 2025-09-04 PROBLEM — I89.0 LYMPHEDEMA, NOT ELSEWHERE CLASSIFIED: Chronic | Status: ACTIVE | Noted: 2025-08-28

## 2025-09-04 PROCEDURE — 99024 POSTOP FOLLOW-UP VISIT: CPT

## (undated) DEVICE — WARMING BLANKET LOWER ADULT

## (undated) DEVICE — GOWN ROYAL SILK XL

## (undated) DEVICE — PACK ANTERIOR SEGMENT

## (undated) DEVICE — PETRI DISH MED 3.5"

## (undated) DEVICE — NDL RETROBULBAR VISITEC 25X1.5

## (undated) DEVICE — SUT NYLON 10-0 12" CU-5

## (undated) DEVICE — SYR LUER LOK 10CC

## (undated) DEVICE — PREP BETADINE SOLUTION 5% OPTHALMIC

## (undated) DEVICE — CULTURESWAB WITHOUT CHARCOAL

## (undated) DEVICE — SYR LUER LOK 5CC

## (undated) DEVICE — DRAPE MICROSCOPE KNOB COVER SMALL (2 PCS)

## (undated) DEVICE — DRAPE MEDIUM SHEET 44" X 70"

## (undated) DEVICE — TIP PHACO STR 30 DEG 20GA

## (undated) DEVICE — NDL HYPO SAFE 18G X 1.5" (PINK)

## (undated) DEVICE — PUNCH TREPH DISP STRL 8MM

## (undated) DEVICE — DRSG TAPE TRANSPORE 1"

## (undated) DEVICE — TUBING ALARIS PUMP MODULE NON-DEHP

## (undated) DEVICE — SOL IRR BAL SALT 500ML

## (undated) DEVICE — SYR LUER LOK 3CC

## (undated) DEVICE — CANNULA IRR ANT CHAMBER 30G

## (undated) DEVICE — ACM SELF RETAINING 20G

## (undated) DEVICE — SPEAR SURG EYE WECK-CELL CELOS

## (undated) DEVICE — VENODYNE/SCD SLEEVE CALF MEDIUM

## (undated) DEVICE — KNIFE ALCON SLIT INTREPID CLEAR-CUT SAFETY 2.4MM

## (undated) DEVICE — NDL HYPO SAFE 25G X 5/8" (ORANGE)

## (undated) DEVICE — Device

## (undated) DEVICE — STOPCOCK 4-WAY

## (undated) DEVICE — SYR FILTER 22 MICRONS